# Patient Record
Sex: MALE | Race: WHITE | Employment: FULL TIME | URBAN - NONMETROPOLITAN AREA
[De-identification: names, ages, dates, MRNs, and addresses within clinical notes are randomized per-mention and may not be internally consistent; named-entity substitution may affect disease eponyms.]

---

## 2018-07-23 ENCOUNTER — HOSPITAL ENCOUNTER (EMERGENCY)
Age: 48
Discharge: HOME | End: 2018-07-23
Payer: COMMERCIAL

## 2018-07-23 VITALS
HEART RATE: 87 BPM | BODY MASS INDEX: 26.84 KG/M2 | SYSTOLIC BLOOD PRESSURE: 146 MMHG | WEIGHT: 167 LBS | HEIGHT: 66 IN | RESPIRATION RATE: 18 BRPM | TEMPERATURE: 98.5 F | OXYGEN SATURATION: 98 % | DIASTOLIC BLOOD PRESSURE: 92 MMHG

## 2018-07-23 DIAGNOSIS — M54.31 SCIATICA OF RIGHT SIDE: Primary | ICD-10-CM

## 2018-07-23 LAB
ALBUMIN SERPL-MCNC: 4.5 G/DL (ref 3.5–5.1)
ALP BLD-CCNC: 92 U/L (ref 38–126)
ALT SERPL-CCNC: 27 U/L (ref 11–66)
ANION GAP SERPL CALCULATED.3IONS-SCNC: 14 MEQ/L (ref 8–16)
AST SERPL-CCNC: 32 U/L (ref 5–40)
BILIRUB SERPL-MCNC: 0.2 MG/DL (ref 0.3–1.2)
BUN BLDV-MCNC: 11 MG/DL (ref 7–22)
CALCIUM SERPL-MCNC: 10.2 MG/DL (ref 8.5–10.5)
CHLORIDE BLD-SCNC: 97 MEQ/L (ref 98–111)
CO2: 29 MEQ/L (ref 23–33)
CREAT SERPL-MCNC: 0.7 MG/DL (ref 0.4–1.2)
GFR SERPL CREATININE-BSD FRML MDRD: > 90 ML/MIN/1.73M2
GLUCOSE BLD-MCNC: 93 MG/DL (ref 70–108)
MAGNESIUM: 2.1 MG/DL (ref 1.6–2.4)
OSMOLALITY CALCULATION: 278.5 MOSMOL/KG (ref 275–300)
POTASSIUM SERPL-SCNC: 4.7 MEQ/L (ref 3.5–5.2)
SODIUM BLD-SCNC: 140 MEQ/L (ref 135–145)
TOTAL PROTEIN: 7.7 G/DL (ref 6.1–8)

## 2018-07-23 PROCEDURE — 6360000002 HC RX W HCPCS: Performed by: NURSE PRACTITIONER

## 2018-07-23 PROCEDURE — 36415 COLL VENOUS BLD VENIPUNCTURE: CPT

## 2018-07-23 PROCEDURE — 6370000000 HC RX 637 (ALT 250 FOR IP): Performed by: NURSE PRACTITIONER

## 2018-07-23 PROCEDURE — 96372 THER/PROPH/DIAG INJ SC/IM: CPT

## 2018-07-23 PROCEDURE — 80053 COMPREHEN METABOLIC PANEL: CPT

## 2018-07-23 PROCEDURE — 99283 EMERGENCY DEPT VISIT LOW MDM: CPT

## 2018-07-23 PROCEDURE — 83735 ASSAY OF MAGNESIUM: CPT

## 2018-07-23 RX ORDER — KETOROLAC TROMETHAMINE 30 MG/ML
30 INJECTION, SOLUTION INTRAMUSCULAR; INTRAVENOUS ONCE
Status: COMPLETED | OUTPATIENT
Start: 2018-07-23 | End: 2018-07-23

## 2018-07-23 RX ORDER — LIDOCAINE 50 MG/G
1 PATCH TOPICAL DAILY
Status: DISCONTINUED | OUTPATIENT
Start: 2018-07-23 | End: 2018-07-23 | Stop reason: HOSPADM

## 2018-07-23 RX ORDER — PREDNISONE 10 MG/1
40 TABLET ORAL DAILY
Qty: 20 TABLET | Refills: 0 | Status: SHIPPED | OUTPATIENT
Start: 2018-07-23 | End: 2018-07-28

## 2018-07-23 RX ORDER — ORPHENADRINE CITRATE 30 MG/ML
60 INJECTION INTRAMUSCULAR; INTRAVENOUS ONCE
Status: COMPLETED | OUTPATIENT
Start: 2018-07-23 | End: 2018-07-23

## 2018-07-23 RX ORDER — LIDOCAINE 50 MG/G
1 PATCH TOPICAL DAILY
Qty: 30 PATCH | Refills: 0 | Status: SHIPPED | OUTPATIENT
Start: 2018-07-23 | End: 2019-11-07 | Stop reason: ALTCHOICE

## 2018-07-23 RX ORDER — MELOXICAM 7.5 MG/1
7.5 TABLET ORAL DAILY
Qty: 15 TABLET | Refills: 0 | Status: SHIPPED | OUTPATIENT
Start: 2018-07-23 | End: 2019-11-21 | Stop reason: ALTCHOICE

## 2018-07-23 RX ORDER — PREDNISONE 20 MG/1
40 TABLET ORAL ONCE
Status: COMPLETED | OUTPATIENT
Start: 2018-07-23 | End: 2018-07-23

## 2018-07-23 RX ADMIN — ORPHENADRINE CITRATE 60 MG: 30 INJECTION INTRAMUSCULAR; INTRAVENOUS at 17:57

## 2018-07-23 RX ADMIN — KETOROLAC TROMETHAMINE 30 MG: 30 INJECTION, SOLUTION INTRAMUSCULAR at 17:56

## 2018-07-23 RX ADMIN — PREDNISONE 40 MG: 20 TABLET ORAL at 19:10

## 2018-07-23 ASSESSMENT — ENCOUNTER SYMPTOMS
COUGH: 0
VOMITING: 0
EYE REDNESS: 0
BACK PAIN: 1
SORE THROAT: 0
RHINORRHEA: 0
WHEEZING: 0
EYE DISCHARGE: 0
ABDOMINAL PAIN: 0
NAUSEA: 0
DIARRHEA: 0
SHORTNESS OF BREATH: 0

## 2018-07-23 ASSESSMENT — PAIN SCALES - GENERAL: PAINLEVEL_OUTOF10: 9

## 2018-07-23 NOTE — ED PROVIDER NOTES
moist and mucous membranes are normal. No oropharyngeal exudate, posterior oropharyngeal edema or posterior oropharyngeal erythema. Eyes: Conjunctivae and EOM are normal.   Neck: Normal range of motion. Neck supple. No JVD present. Cardiovascular: Normal rate, regular rhythm, normal heart sounds, intact distal pulses and normal pulses. Exam reveals no gallop and no friction rub. No murmur heard. Pulmonary/Chest: Effort normal and breath sounds normal. No respiratory distress. He has no decreased breath sounds. He has no wheezes. He has no rhonchi. He has no rales. Abdominal: Soft. Bowel sounds are normal. He exhibits no distension. There is no tenderness. There is no rebound, no guarding and no CVA tenderness. Musculoskeletal: Normal range of motion. He exhibits no edema. Neurological: He is alert and oriented to person, place, and time. He exhibits normal muscle tone. Coordination normal.   Skin: Skin is warm and dry. No rash noted. He is not diaphoretic. Nursing note and vitals reviewed. DIFFERENTIAL DIAGNOSIS:   Including but not limited to chronic back pain, sciatica, and musculoskeletal pain. DIAGNOSTIC RESULTS     EKG: All EKG's are interpreted by the Emergency Department Physician who either signs or Co-signs this chart in the absence of a cardiologist.  None    RADIOLOGY: non-plain film images(s) such as CT, Ultrasound and MRI are read by the radiologist.  Plain radiographic images are visualized and preliminarily interpreted by the emergency physician unless otherwise stated below.   No orders to display         LABS:   Labs Reviewed   COMPREHENSIVE METABOLIC PANEL - Abnormal; Notable for the following:        Result Value    Chloride 97 (*)     Total Bilirubin 0.2 (*)     All other components within normal limits   MAGNESIUM   ANION GAP   GLOMERULAR FILTRATION RATE, ESTIMATED   OSMOLALITY         EMERGENCY DEPARTMENT COURSE AND MEDICAL DECISION MAKING:   Vitals:    Vitals: CNP  07/27/18 0154

## 2019-02-05 ENCOUNTER — APPOINTMENT (OUTPATIENT)
Dept: GENERAL RADIOLOGY | Age: 49
End: 2019-02-05
Payer: COMMERCIAL

## 2019-02-05 ENCOUNTER — HOSPITAL ENCOUNTER (EMERGENCY)
Age: 49
Discharge: HOME | End: 2019-02-05
Payer: COMMERCIAL

## 2019-02-05 VITALS
DIASTOLIC BLOOD PRESSURE: 73 MMHG | BODY MASS INDEX: 27.6 KG/M2 | RESPIRATION RATE: 16 BRPM | WEIGHT: 171 LBS | SYSTOLIC BLOOD PRESSURE: 113 MMHG | TEMPERATURE: 99.1 F | HEART RATE: 92 BPM | OXYGEN SATURATION: 91 %

## 2019-02-05 DIAGNOSIS — B34.9 VIRAL ILLNESS: Primary | ICD-10-CM

## 2019-02-05 LAB
ALBUMIN SERPL-MCNC: 5 G/DL (ref 3.5–5.1)
ALP BLD-CCNC: 102 U/L (ref 38–126)
ALT SERPL-CCNC: 67 U/L (ref 11–66)
ANION GAP SERPL CALCULATED.3IONS-SCNC: 18 MEQ/L (ref 8–16)
AST SERPL-CCNC: 99 U/L (ref 5–40)
BACTERIA: ABNORMAL /HPF
BASOPHILS # BLD: 0.1 %
BASOPHILS ABSOLUTE: 0 THOU/MM3 (ref 0–0.1)
BILIRUB SERPL-MCNC: 0.6 MG/DL (ref 0.3–1.2)
BILIRUBIN DIRECT: < 0.2 MG/DL (ref 0–0.3)
BILIRUBIN URINE: ABNORMAL
BLOOD, URINE: ABNORMAL
BUN BLDV-MCNC: 22 MG/DL (ref 7–22)
CALCIUM SERPL-MCNC: 9.6 MG/DL (ref 8.5–10.5)
CASTS 2: ABNORMAL /LPF
CASTS UA: ABNORMAL /LPF
CHARACTER, URINE: CLEAR
CHLORIDE BLD-SCNC: 99 MEQ/L (ref 98–111)
CO2: 25 MEQ/L (ref 23–33)
COLOR: ABNORMAL
CREAT SERPL-MCNC: 0.8 MG/DL (ref 0.4–1.2)
CRYSTALS, UA: ABNORMAL
EOSINOPHIL # BLD: 0 %
EOSINOPHILS ABSOLUTE: 0 THOU/MM3 (ref 0–0.4)
EPITHELIAL CELLS, UA: ABNORMAL /HPF
ERYTHROCYTE [DISTWIDTH] IN BLOOD BY AUTOMATED COUNT: 13.4 % (ref 11.5–14.5)
ERYTHROCYTE [DISTWIDTH] IN BLOOD BY AUTOMATED COUNT: 42 FL (ref 35–45)
FLU A ANTIGEN: NEGATIVE
FLU B ANTIGEN: NEGATIVE
GFR SERPL CREATININE-BSD FRML MDRD: > 90 ML/MIN/1.73M2
GLUCOSE BLD-MCNC: 124 MG/DL (ref 70–108)
GLUCOSE URINE: NEGATIVE MG/DL
HCT VFR BLD CALC: 49.6 % (ref 42–52)
HEMOGLOBIN: 17 GM/DL (ref 14–18)
ICTOTEST: NEGATIVE
IMMATURE GRANS (ABS): 0.02 THOU/MM3 (ref 0–0.07)
IMMATURE GRANULOCYTES: 0.2 %
KETONES, URINE: NEGATIVE
LEUKOCYTE ESTERASE, URINE: NEGATIVE
LIPASE: 38.8 U/L (ref 5.6–51.3)
LYMPHOCYTES # BLD: 8.4 %
LYMPHOCYTES ABSOLUTE: 0.7 THOU/MM3 (ref 1–4.8)
MCH RBC QN AUTO: 29.7 PG (ref 26–33)
MCHC RBC AUTO-ENTMCNC: 34.3 GM/DL (ref 32.2–35.5)
MCV RBC AUTO: 86.6 FL (ref 80–94)
MISCELLANEOUS 2: ABNORMAL
MONOCYTES # BLD: 6.2 %
MONOCYTES ABSOLUTE: 0.5 THOU/MM3 (ref 0.4–1.3)
MUCUS: ABNORMAL
NITRITE, URINE: NEGATIVE
NUCLEATED RED BLOOD CELLS: 0 /100 WBC
OSMOLALITY CALCULATION: 287.9 MOSMOL/KG (ref 275–300)
PH UA: 5.5
PLATELET # BLD: 206 THOU/MM3 (ref 130–400)
PMV BLD AUTO: 10.3 FL (ref 9.4–12.4)
POTASSIUM SERPL-SCNC: 4.4 MEQ/L (ref 3.5–5.2)
PRO-BNP: 41 PG/ML (ref 0–450)
PROTEIN UA: 100
RBC # BLD: 5.73 MILL/MM3 (ref 4.7–6.1)
RBC URINE: ABNORMAL /HPF
RENAL EPITHELIAL, UA: ABNORMAL
SEG NEUTROPHILS: 85.1 %
SEGMENTED NEUTROPHILS ABSOLUTE COUNT: 7.5 THOU/MM3 (ref 1.8–7.7)
SODIUM BLD-SCNC: 142 MEQ/L (ref 135–145)
SPECIFIC GRAVITY, URINE: > 1.03 (ref 1–1.03)
TOTAL PROTEIN: 7.9 G/DL (ref 6.1–8)
TROPONIN T: < 0.01 NG/ML
UROBILINOGEN, URINE: 1 EU/DL
WBC # BLD: 8.8 THOU/MM3 (ref 4.8–10.8)
WBC UA: ABNORMAL /HPF
YEAST: ABNORMAL

## 2019-02-05 PROCEDURE — 87804 INFLUENZA ASSAY W/OPTIC: CPT

## 2019-02-05 PROCEDURE — 81001 URINALYSIS AUTO W/SCOPE: CPT

## 2019-02-05 PROCEDURE — 99284 EMERGENCY DEPT VISIT MOD MDM: CPT

## 2019-02-05 PROCEDURE — 71046 X-RAY EXAM CHEST 2 VIEWS: CPT

## 2019-02-05 PROCEDURE — 6370000000 HC RX 637 (ALT 250 FOR IP): Performed by: PHYSICIAN ASSISTANT

## 2019-02-05 PROCEDURE — 85025 COMPLETE CBC W/AUTO DIFF WBC: CPT

## 2019-02-05 PROCEDURE — 82248 BILIRUBIN DIRECT: CPT

## 2019-02-05 PROCEDURE — 2580000003 HC RX 258: Performed by: PHYSICIAN ASSISTANT

## 2019-02-05 PROCEDURE — 84484 ASSAY OF TROPONIN QUANT: CPT

## 2019-02-05 PROCEDURE — 96374 THER/PROPH/DIAG INJ IV PUSH: CPT

## 2019-02-05 PROCEDURE — 36415 COLL VENOUS BLD VENIPUNCTURE: CPT

## 2019-02-05 PROCEDURE — 6360000002 HC RX W HCPCS: Performed by: PHYSICIAN ASSISTANT

## 2019-02-05 PROCEDURE — 80053 COMPREHEN METABOLIC PANEL: CPT

## 2019-02-05 PROCEDURE — 83690 ASSAY OF LIPASE: CPT

## 2019-02-05 PROCEDURE — 83880 ASSAY OF NATRIURETIC PEPTIDE: CPT

## 2019-02-05 PROCEDURE — 74018 RADEX ABDOMEN 1 VIEW: CPT

## 2019-02-05 RX ORDER — 0.9 % SODIUM CHLORIDE 0.9 %
500 INTRAVENOUS SOLUTION INTRAVENOUS ONCE
Status: COMPLETED | OUTPATIENT
Start: 2019-02-05 | End: 2019-02-05

## 2019-02-05 RX ORDER — ONDANSETRON 2 MG/ML
4 INJECTION INTRAMUSCULAR; INTRAVENOUS ONCE
Status: COMPLETED | OUTPATIENT
Start: 2019-02-05 | End: 2019-02-05

## 2019-02-05 RX ORDER — SUCRALFATE 1 G/1
1 TABLET ORAL ONCE
Status: COMPLETED | OUTPATIENT
Start: 2019-02-05 | End: 2019-02-05

## 2019-02-05 RX ORDER — BENZONATATE 100 MG/1
100 CAPSULE ORAL 3 TIMES DAILY PRN
Qty: 20 CAPSULE | Refills: 0 | Status: SHIPPED | OUTPATIENT
Start: 2019-02-05 | End: 2019-11-07

## 2019-02-05 RX ORDER — ONDANSETRON 4 MG/1
4 TABLET, ORALLY DISINTEGRATING ORAL EVERY 8 HOURS PRN
Qty: 20 TABLET | Refills: 0 | Status: SHIPPED | OUTPATIENT
Start: 2019-02-05 | End: 2019-11-07

## 2019-02-05 RX ORDER — PANTOPRAZOLE SODIUM 20 MG/1
40 TABLET, DELAYED RELEASE ORAL DAILY
Qty: 30 TABLET | Refills: 0 | Status: SHIPPED | OUTPATIENT
Start: 2019-02-05 | End: 2019-11-07

## 2019-02-05 RX ORDER — SUCRALFATE ORAL 1 G/10ML
1 SUSPENSION ORAL 4 TIMES DAILY
Qty: 240 ML | Refills: 0 | Status: SHIPPED | OUTPATIENT
Start: 2019-02-05 | End: 2019-11-07

## 2019-02-05 RX ADMIN — LIDOCAINE HYDROCHLORIDE: 20 SOLUTION ORAL; TOPICAL at 17:30

## 2019-02-05 RX ADMIN — ONDANSETRON 4 MG: 2 INJECTION INTRAMUSCULAR; INTRAVENOUS at 16:49

## 2019-02-05 RX ADMIN — SODIUM CHLORIDE 500 ML: 9 INJECTION, SOLUTION INTRAVENOUS at 16:45

## 2019-02-05 RX ADMIN — SUCRALFATE 1 G: 1 TABLET ORAL at 17:30

## 2019-02-05 ASSESSMENT — ENCOUNTER SYMPTOMS
RHINORRHEA: 0
SORE THROAT: 0
CONSTIPATION: 0
ABDOMINAL PAIN: 1
NAUSEA: 1
PHOTOPHOBIA: 0
EYE PAIN: 0
BLOOD IN STOOL: 0
SHORTNESS OF BREATH: 1
COUGH: 1
DIARRHEA: 1
BACK PAIN: 0
CHEST TIGHTNESS: 1
WHEEZING: 0
VOMITING: 1

## 2019-11-07 ENCOUNTER — HOSPITAL ENCOUNTER (EMERGENCY)
Age: 49
Discharge: HOME | End: 2019-11-07
Payer: COMMERCIAL

## 2019-11-07 ENCOUNTER — HOSPITAL ENCOUNTER (EMERGENCY)
Dept: GENERAL RADIOLOGY | Age: 49
Discharge: HOME | End: 2019-11-07
Payer: COMMERCIAL

## 2019-11-07 VITALS
TEMPERATURE: 97.6 F | RESPIRATION RATE: 16 BRPM | HEART RATE: 72 BPM | BODY MASS INDEX: 27.6 KG/M2 | DIASTOLIC BLOOD PRESSURE: 81 MMHG | SYSTOLIC BLOOD PRESSURE: 147 MMHG | WEIGHT: 171 LBS | OXYGEN SATURATION: 97 %

## 2019-11-07 DIAGNOSIS — M77.11 RIGHT LATERAL EPICONDYLITIS: Primary | ICD-10-CM

## 2019-11-07 PROCEDURE — 73080 X-RAY EXAM OF ELBOW: CPT

## 2019-11-07 PROCEDURE — 99213 OFFICE O/P EST LOW 20 MIN: CPT

## 2019-11-07 PROCEDURE — 99214 OFFICE O/P EST MOD 30 MIN: CPT | Performed by: NURSE PRACTITIONER

## 2019-11-07 RX ORDER — PREDNISONE 20 MG/1
20 TABLET ORAL 2 TIMES DAILY
Qty: 14 TABLET | Refills: 0 | Status: SHIPPED | OUTPATIENT
Start: 2019-11-07 | End: 2019-11-14

## 2019-11-07 SDOH — HEALTH STABILITY: MENTAL HEALTH: HOW OFTEN DO YOU HAVE A DRINK CONTAINING ALCOHOL?: NEVER

## 2019-11-07 ASSESSMENT — PAIN DESCRIPTION - LOCATION: LOCATION: ELBOW

## 2019-11-07 ASSESSMENT — PAIN DESCRIPTION - DESCRIPTORS: DESCRIPTORS: BURNING

## 2019-11-07 ASSESSMENT — PAIN DESCRIPTION - ORIENTATION: ORIENTATION: RIGHT

## 2019-11-07 ASSESSMENT — PAIN SCALES - GENERAL: PAINLEVEL_OUTOF10: 8

## 2019-11-07 ASSESSMENT — PAIN DESCRIPTION - PAIN TYPE: TYPE: ACUTE PAIN

## 2019-11-07 ASSESSMENT — PAIN DESCRIPTION - FREQUENCY: FREQUENCY: CONTINUOUS

## 2019-11-08 ASSESSMENT — ENCOUNTER SYMPTOMS
DIARRHEA: 0
COUGH: 0
RHINORRHEA: 0
VOMITING: 0
BACK PAIN: 0
EYE DISCHARGE: 0
EYE REDNESS: 0
SORE THROAT: 0
TROUBLE SWALLOWING: 0
NAUSEA: 0
SHORTNESS OF BREATH: 0
EYE PAIN: 0
ABDOMINAL PAIN: 0
ALLERGIC/IMMUNOLOGIC NEGATIVE: 1
WHEEZING: 0
CONSTIPATION: 0

## 2019-11-21 ENCOUNTER — OFFICE VISIT (OUTPATIENT)
Dept: FAMILY MEDICINE CLINIC | Age: 49
End: 2019-11-21
Payer: COMMERCIAL

## 2019-11-21 VITALS
SYSTOLIC BLOOD PRESSURE: 100 MMHG | WEIGHT: 171 LBS | RESPIRATION RATE: 20 BRPM | HEIGHT: 68 IN | TEMPERATURE: 97.9 F | DIASTOLIC BLOOD PRESSURE: 72 MMHG | HEART RATE: 84 BPM | BODY MASS INDEX: 25.91 KG/M2

## 2019-11-21 DIAGNOSIS — Z87.891 HISTORY OF SMOKING 30 OR MORE PACK YEARS: ICD-10-CM

## 2019-11-21 DIAGNOSIS — Z00.00 WELLNESS EXAMINATION: Primary | ICD-10-CM

## 2019-11-21 DIAGNOSIS — M77.11 RIGHT LATERAL EPICONDYLITIS: ICD-10-CM

## 2019-11-21 DIAGNOSIS — F41.9 ANXIETY: ICD-10-CM

## 2019-11-21 LAB
ALBUMIN SERPL-MCNC: 4.4 G/DL (ref 3.5–5.1)
ALP BLD-CCNC: 109 U/L (ref 38–126)
ALT SERPL-CCNC: 18 U/L (ref 11–66)
ANION GAP SERPL CALCULATED.3IONS-SCNC: 14 MEQ/L (ref 8–16)
AST SERPL-CCNC: 28 U/L (ref 5–40)
AVERAGE GLUCOSE: 120 MG/DL (ref 70–126)
BASOPHILS # BLD: 0.2 %
BASOPHILS ABSOLUTE: 0 THOU/MM3 (ref 0–0.1)
BILIRUB SERPL-MCNC: 0.2 MG/DL (ref 0.3–1.2)
BILIRUBIN DIRECT: < 0.2 MG/DL (ref 0–0.3)
BUN BLDV-MCNC: 11 MG/DL (ref 7–22)
CALCIUM SERPL-MCNC: 9.8 MG/DL (ref 8.5–10.5)
CHLORIDE BLD-SCNC: 100 MEQ/L (ref 98–111)
CHOLESTEROL, TOTAL: 180 MG/DL (ref 100–199)
CO2: 26 MEQ/L (ref 23–33)
CREAT SERPL-MCNC: 0.6 MG/DL (ref 0.4–1.2)
EOSINOPHIL # BLD: 1.8 %
EOSINOPHILS ABSOLUTE: 0.2 THOU/MM3 (ref 0–0.4)
ERYTHROCYTE [DISTWIDTH] IN BLOOD BY AUTOMATED COUNT: 13.9 % (ref 11.5–14.5)
ERYTHROCYTE [DISTWIDTH] IN BLOOD BY AUTOMATED COUNT: 45.1 FL (ref 35–45)
GFR SERPL CREATININE-BSD FRML MDRD: > 90 ML/MIN/1.73M2
GLUCOSE BLD-MCNC: 131 MG/DL (ref 70–108)
HBA1C MFR BLD: 6 % (ref 4.4–6.4)
HCT VFR BLD CALC: 44.8 % (ref 42–52)
HDLC SERPL-MCNC: 37 MG/DL
HEMOGLOBIN: 14.8 GM/DL (ref 14–18)
IMMATURE GRANS (ABS): 0.02 THOU/MM3 (ref 0–0.07)
IMMATURE GRANULOCYTES: 0.2 %
LDL CHOLESTEROL CALCULATED: 94 MG/DL
LYMPHOCYTES # BLD: 23.3 %
LYMPHOCYTES ABSOLUTE: 2.3 THOU/MM3 (ref 1–4.8)
MCH RBC QN AUTO: 29.1 PG (ref 26–33)
MCHC RBC AUTO-ENTMCNC: 33 GM/DL (ref 32.2–35.5)
MCV RBC AUTO: 88 FL (ref 80–94)
MONOCYTES # BLD: 4.2 %
MONOCYTES ABSOLUTE: 0.4 THOU/MM3 (ref 0.4–1.3)
NUCLEATED RED BLOOD CELLS: 0 /100 WBC
PLATELET # BLD: 209 THOU/MM3 (ref 130–400)
PMV BLD AUTO: 11.5 FL (ref 9.4–12.4)
POTASSIUM SERPL-SCNC: 3.8 MEQ/L (ref 3.5–5.2)
RBC # BLD: 5.09 MILL/MM3 (ref 4.7–6.1)
SEG NEUTROPHILS: 70.3 %
SEGMENTED NEUTROPHILS ABSOLUTE COUNT: 7 THOU/MM3 (ref 1.8–7.7)
SODIUM BLD-SCNC: 140 MEQ/L (ref 135–145)
TOTAL PROTEIN: 7.5 G/DL (ref 6.1–8)
TRIGL SERPL-MCNC: 244 MG/DL (ref 0–199)
WBC # BLD: 9.9 THOU/MM3 (ref 4.8–10.8)

## 2019-11-21 PROCEDURE — 99386 PREV VISIT NEW AGE 40-64: CPT | Performed by: NURSE PRACTITIONER

## 2019-11-21 PROCEDURE — 90688 IIV4 VACCINE SPLT 0.5 ML IM: CPT | Performed by: NURSE PRACTITIONER

## 2019-11-21 PROCEDURE — 90471 IMMUNIZATION ADMIN: CPT | Performed by: NURSE PRACTITIONER

## 2019-11-21 RX ORDER — TRAMADOL HYDROCHLORIDE 50 MG/1
50 TABLET ORAL EVERY 8 HOURS PRN
Qty: 21 TABLET | Refills: 0 | Status: SHIPPED | OUTPATIENT
Start: 2019-11-21 | End: 2019-11-28

## 2019-11-21 ASSESSMENT — ENCOUNTER SYMPTOMS
COUGH: 0
SORE THROAT: 0
EYE PAIN: 0
VOMITING: 0
ALLERGIC/IMMUNOLOGIC NEGATIVE: 1
CONSTIPATION: 0
BACK PAIN: 0
WHEEZING: 0
ABDOMINAL PAIN: 0
EYE REDNESS: 0
SHORTNESS OF BREATH: 0
DIARRHEA: 0
EYE DISCHARGE: 0
RHINORRHEA: 0
NAUSEA: 0
TROUBLE SWALLOWING: 0

## 2019-11-21 ASSESSMENT — PATIENT HEALTH QUESTIONNAIRE - PHQ9
SUM OF ALL RESPONSES TO PHQ QUESTIONS 1-9: 0
2. FEELING DOWN, DEPRESSED OR HOPELESS: 0
1. LITTLE INTEREST OR PLEASURE IN DOING THINGS: 0
SUM OF ALL RESPONSES TO PHQ9 QUESTIONS 1 & 2: 0
SUM OF ALL RESPONSES TO PHQ QUESTIONS 1-9: 0

## 2019-11-22 ENCOUNTER — TELEPHONE (OUTPATIENT)
Dept: FAMILY MEDICINE CLINIC | Age: 49
End: 2019-11-22

## 2019-11-22 DIAGNOSIS — E78.1 HIGH TRIGLYCERIDES: Primary | ICD-10-CM

## 2019-11-23 LAB — HIV-2 AB: NEGATIVE

## 2019-12-05 ENCOUNTER — HOSPITAL ENCOUNTER (OUTPATIENT)
Dept: MRI IMAGING | Age: 49
Discharge: HOME | End: 2019-12-05
Payer: COMMERCIAL

## 2019-12-05 DIAGNOSIS — M77.11 RIGHT LATERAL EPICONDYLITIS: ICD-10-CM

## 2019-12-05 PROCEDURE — 73221 MRI JOINT UPR EXTREM W/O DYE: CPT

## 2020-10-01 ENCOUNTER — OFFICE VISIT (OUTPATIENT)
Dept: FAMILY MEDICINE CLINIC | Age: 50
End: 2020-10-01
Payer: COMMERCIAL

## 2020-10-01 VITALS
RESPIRATION RATE: 16 BRPM | SYSTOLIC BLOOD PRESSURE: 120 MMHG | OXYGEN SATURATION: 94 % | WEIGHT: 166.6 LBS | TEMPERATURE: 97 F | DIASTOLIC BLOOD PRESSURE: 78 MMHG | BODY MASS INDEX: 25.71 KG/M2 | HEART RATE: 99 BPM

## 2020-10-01 PROCEDURE — 99396 PREV VISIT EST AGE 40-64: CPT | Performed by: NURSE PRACTITIONER

## 2020-10-01 RX ORDER — PREDNISONE 20 MG/1
20 TABLET ORAL 2 TIMES DAILY
Qty: 10 TABLET | Refills: 0 | Status: SHIPPED | OUTPATIENT
Start: 2020-10-01 | End: 2020-10-06

## 2020-10-01 RX ORDER — LORAZEPAM 2 MG/1
2 TABLET ORAL DAILY
Qty: 10 TABLET | Refills: 0 | Status: SHIPPED | OUTPATIENT
Start: 2020-10-01 | End: 2021-02-22 | Stop reason: SDUPTHER

## 2020-10-01 RX ORDER — ATORVASTATIN CALCIUM 10 MG/1
10 TABLET, FILM COATED ORAL DAILY
Qty: 90 TABLET | Refills: 1 | Status: SHIPPED | OUTPATIENT
Start: 2020-10-01 | End: 2021-07-14 | Stop reason: SDUPTHER

## 2020-10-01 SDOH — ECONOMIC STABILITY: FOOD INSECURITY: WITHIN THE PAST 12 MONTHS, THE FOOD YOU BOUGHT JUST DIDN'T LAST AND YOU DIDN'T HAVE MONEY TO GET MORE.: NEVER TRUE

## 2020-10-01 SDOH — ECONOMIC STABILITY: TRANSPORTATION INSECURITY
IN THE PAST 12 MONTHS, HAS LACK OF TRANSPORTATION KEPT YOU FROM MEETINGS, WORK, OR FROM GETTING THINGS NEEDED FOR DAILY LIVING?: NO

## 2020-10-01 SDOH — ECONOMIC STABILITY: FOOD INSECURITY: WITHIN THE PAST 12 MONTHS, YOU WORRIED THAT YOUR FOOD WOULD RUN OUT BEFORE YOU GOT MONEY TO BUY MORE.: NEVER TRUE

## 2020-10-01 SDOH — ECONOMIC STABILITY: TRANSPORTATION INSECURITY
IN THE PAST 12 MONTHS, HAS THE LACK OF TRANSPORTATION KEPT YOU FROM MEDICAL APPOINTMENTS OR FROM GETTING MEDICATIONS?: NO

## 2020-10-01 SDOH — ECONOMIC STABILITY: INCOME INSECURITY: HOW HARD IS IT FOR YOU TO PAY FOR THE VERY BASICS LIKE FOOD, HOUSING, MEDICAL CARE, AND HEATING?: NOT HARD AT ALL

## 2020-10-01 ASSESSMENT — ENCOUNTER SYMPTOMS
CONSTIPATION: 0
SHORTNESS OF BREATH: 0
ALLERGIC/IMMUNOLOGIC NEGATIVE: 1
RHINORRHEA: 0
DIARRHEA: 0
EYE PAIN: 0
COUGH: 0
VOMITING: 0
BACK PAIN: 0
EYE REDNESS: 0
ABDOMINAL PAIN: 0
SORE THROAT: 0
WHEEZING: 0
TROUBLE SWALLOWING: 0
NAUSEA: 0
EYE DISCHARGE: 0

## 2020-10-01 ASSESSMENT — PATIENT HEALTH QUESTIONNAIRE - PHQ9
1. LITTLE INTEREST OR PLEASURE IN DOING THINGS: 0
SUM OF ALL RESPONSES TO PHQ QUESTIONS 1-9: 0
SUM OF ALL RESPONSES TO PHQ QUESTIONS 1-9: 0
2. FEELING DOWN, DEPRESSED OR HOPELESS: 0
SUM OF ALL RESPONSES TO PHQ9 QUESTIONS 1 & 2: 0

## 2020-10-01 NOTE — PROGRESS NOTES
10/1/2020    Shashank Vail (:  1970) is a 52 y.o. male, here for a preventive medicine evaluation. Main recent issue has to do with panic attacks a couple times a month recently due to many deaths around him and fear of being ill. Patient states he has taken Ativan about 20 times a year for the last 10 years. This has been given to him by someone else. Has never used street drugs, narcotics or antidepressants. Is happy in his marriage, has no recent injuries on the job as a . Currently has upper respiratory infection symptoms, continues to smoke. No diarrhea or constipation problems, no back pain or headaches. Patient Active Problem List   Diagnosis    CAD (coronary artery disease)    MI, old    Arthralgia    Rash    History of smoking 30 or more pack years    Anxiety       Review of Systems   Constitutional: Negative for activity change, fatigue and fever. HENT: Negative for congestion, ear pain, rhinorrhea, sore throat and trouble swallowing. Eyes: Negative for pain, discharge and redness. Respiratory: Negative for cough, shortness of breath and wheezing. Cardiovascular: Negative. Gastrointestinal: Negative for abdominal pain, constipation, diarrhea, nausea and vomiting. Endocrine: Negative. Genitourinary: Negative for dysuria, frequency and urgency. Musculoskeletal: Negative for arthralgias, back pain and myalgias. Skin: Negative for rash. Allergic/Immunologic: Negative. Neurological: Negative for dizziness, tremors, weakness and headaches. Hematological: Negative. Psychiatric/Behavioral: Negative for dysphoric mood and sleep disturbance. The patient is nervous/anxious. Prior to Visit Medications    Medication Sig Taking?  Authorizing Provider   atorvastatin (LIPITOR) 10 MG tablet Take 1 tablet by mouth daily Yes ROSAURA Bolanos - CNP   predniSONE (DELTASONE) 20 MG tablet Take 1 tablet by mouth 2 times daily for 5 days Yes ROSAURA Anguiano CNP   LORazepam (ATIVAN) 2 MG tablet Take 1 tablet by mouth daily for 10 days. Yes ROSAURA Anguiano CNP   aspirin EC 81 MG EC tablet Take 1 tablet by mouth daily.   Ankita Mera MD        Allergies   Allergen Reactions    Morphine Rash       Past Medical History:   Diagnosis Date    Anginal pain ([facility] [address] 2005    Asthma 1984    CAD (coronary artery disease) 4/2012    Previously seen Dr. Kim Quiros, old 04/30/2012       Past Surgical History:   Procedure Laterality Date   [address]  4/2012    Per Pt \"no stent\"    Sigrid Serna  2009    Dr. Layo Gruber  2008    benign tumor       Social History     Socioeconomic History    Marital status:      Spouse name: Dora Barnes Number of children: 4    Years of education: 13    Highest education level: Not on file   Occupational History    Occupation:      Comment: self-employeed   Social Needs    Financial resource strain: Not hard at all   Raven[place]-Luz Maria insecurity     Worry: Never true     Inability: Never true    Transportation needs     Medical: No     Non-medical: No   Tobacco Use    Smoking status: Current Every Day Smoker     Packs/day: 1.50     Years: 30.00     Pack years: 45.00    Smokeless tobacco: Never Used    Tobacco comment: printed to avs   Substance and Sexual Activity    Alcohol use: No     Frequency: Never    Drug use: No    Sexual activity: Not on file   Lifestyle    Physical activity     Days per week: Not on file     Minutes per session: Not on file    Stress: Not on file   Relationships    Social connections     Talks on phone: Not on file     Gets together: Not on file     Attends Baptism service: Not on file     Active member of club or organization: Not on file     Attends meetings of clubs or organizations: Not on file     Relationship status: Not on file    Intimate partner violence     Fear of current or ex partner: Not on file Emotionally abused: Not on file     Physically abused: Not on file     Forced sexual activity: Not on file   Other Topics Concern    Not on file   Social History Narrative    Not on file        Family History   Problem Relation Age of Onset    Arthritis Mother     Alcohol Abuse Father     COPD Father 62    Hypertension Father 54    Cancer Father         Unsure    Cancer Maternal Aunt 61        lung     Cancer Paternal Aunt     Parkinsonism Paternal Uncle     Cancer Maternal Grandfather 61    Heart Attack Paternal Grandfather     Heart Disease Paternal Grandfather     Coronary Art Dis Paternal Grandfather     Stroke Paternal Aunt     Cancer Paternal Aunt     Alzheimer's Disease Paternal Aunt     Cancer Paternal Uncle     Other Paternal Uncle         Lung Infection     Diabetes Paternal Uncle        ADVANCE DIRECTIVE: N, <no information>    Vitals:    10/01/20 0937   BP: 120/78   Pulse: 99   Resp: 16   Temp: 97 °F (36.1 °C)   TempSrc: Temporal   SpO2: 94%   Weight: 166 lb 9.6 oz (75.6 kg)     Estimated body mass index is 25.71 kg/m² as calculated from the following:    Height as of 11/21/19: 5' 7.5\" (1.715 m). Weight as of this encounter: 166 lb 9.6 oz (75.6 kg). Physical Exam  Vitals signs reviewed. Constitutional:       General: He is not in acute distress. Appearance: Normal appearance. He is well-developed. He is not toxic-appearing or diaphoretic. HENT:      Head: Normocephalic. No right periorbital erythema or left periorbital erythema. Jaw: No trismus. Right Ear: Hearing, tympanic membrane, ear canal and external ear normal.      Left Ear: Hearing, tympanic membrane, ear canal and external ear normal.      Nose: Nose normal. No mucosal edema or rhinorrhea. Mouth/Throat:      Mouth: Mucous membranes are moist.      Dentition: Normal dentition. Pharynx: Uvula midline. No posterior oropharyngeal erythema. Tonsils: No tonsillar exudate. 0 on the right.  0

## 2021-01-19 ENCOUNTER — VIRTUAL VISIT (OUTPATIENT)
Dept: FAMILY MEDICINE CLINIC | Age: 51
End: 2021-01-19
Payer: COMMERCIAL

## 2021-01-19 DIAGNOSIS — J06.9 URI WITH COUGH AND CONGESTION: Primary | ICD-10-CM

## 2021-01-19 PROCEDURE — 99442 PR PHYS/QHP TELEPHONE EVALUATION 11-20 MIN: CPT | Performed by: NURSE PRACTITIONER

## 2021-01-19 ASSESSMENT — ENCOUNTER SYMPTOMS
BACK PAIN: 0
COUGH: 0
CONSTIPATION: 0
SHORTNESS OF BREATH: 0
NAUSEA: 0
EYE DISCHARGE: 0
DIARRHEA: 0
RHINORRHEA: 0
WHEEZING: 0
EYE REDNESS: 0
ABDOMINAL PAIN: 0
TROUBLE SWALLOWING: 0
VOMITING: 0
EYE PAIN: 0
SORE THROAT: 0
ALLERGIC/IMMUNOLOGIC NEGATIVE: 1

## 2021-01-19 NOTE — PROGRESS NOTES
[address] [address] [address]  [facility] [address]  Dept: [phone]  Dept Fax: [phone]  Loc: [phone]     Visit Date:  1/19/2021      Patient:  Aydee Gómez  YOB: 1970    HPI:     Chief Complaint   Patient presents with    Pharyngitis       This is a phone visit. Symptoms starting 3 days ago. Congestion, cough, sore throat. Denies fever, fatigue, diarrhea, headache. Is a smoker. Pharyngitis  Pertinent negatives include no abdominal pain, arthralgias, congestion, coughing, fatigue, fever, headaches, myalgias, nausea, rash, sore throat, vomiting or weakness. Medications    Current Outpatient Medications:     atorvastatin (LIPITOR) 10 MG tablet, Take 1 tablet by mouth daily, Disp: 90 tablet, Rfl: 1    aspirin EC 81 MG EC tablet, Take 1 tablet by mouth daily. , Disp: 30 tablet, Rfl: 3    The patient is allergic to morphine. Past Medical History  Justo Laws  has a past medical history of Anginal pain ([facility] [address] Asthma, CAD (coronary artery disease), and MI, old. Subjective:      Review of Systems   Constitutional: Negative for activity change, fatigue and fever. HENT: Negative for congestion, ear pain, rhinorrhea, sore throat and trouble swallowing. Eyes: Negative for pain, discharge and redness. Respiratory: Negative for cough, shortness of breath and wheezing. Cardiovascular: Negative. Gastrointestinal: Negative for abdominal pain, constipation, diarrhea, nausea and vomiting. Endocrine: Negative. Genitourinary: Negative for dysuria, frequency and urgency. Musculoskeletal: Negative for arthralgias, back pain and myalgias. Skin: Negative for rash. Allergic/Immunologic: Negative. Neurological: Negative for dizziness, tremors, weakness and headaches. Hematological: Negative. Psychiatric/Behavioral: Negative for dysphoric mood and sleep disturbance. The patient is not nervous/anxious. Objective: There were no vitals taken for this visit. Physical Exam  Constitutional:       General: He is not in acute distress. Neurological:      Mental Status: He is alert and oriented to person, place, and time. Psychiatric:         Mood and Affect: Mood normal.         Behavior: Behavior normal.         Thought Content: Thought content normal.         Judgment: Judgment normal.         Assessment/Plan:      Fadia Griffin was seen today for pharyngitis. Diagnoses and all orders for this visit:    URI with cough and congestion  -     COVID-19 Ambulatory; Future      Dannie Niño is a 52 y.o. male evaluated via telephone on 1/19/2021. Consent:  He and/or health care decision maker is aware that that he may receive a bill for this telephone service, depending on his insurance coverage, and has provided verbal consent to proceed: Yes      I affirm this is a Patient Initiated Episode with a Patient who has not had a related appointment within my department in the past 7 days or scheduled within the next 24 hours. Patient identification was verified at the start of the visit: Yes    Total Time: minutes: 11-20 minutes    Note: not billable if this call serves to triage the patient into an appointment for the relevant concern      Yumikoshelly Walton       Return if symptoms worsen or fail to improve.

## 2021-01-20 LAB — SARS-COV-2: NOT DETECTED

## 2021-01-25 ENCOUNTER — TELEPHONE (OUTPATIENT)
Dept: FAMILY MEDICINE CLINIC | Age: 51
End: 2021-01-25

## 2021-01-25 DIAGNOSIS — J06.9 ACUTE UPPER RESPIRATORY INFECTION: Primary | ICD-10-CM

## 2021-01-25 RX ORDER — PREDNISONE 20 MG/1
20 TABLET ORAL 2 TIMES DAILY
Qty: 10 TABLET | Refills: 0 | Status: SHIPPED | OUTPATIENT
Start: 2021-01-25 | End: 2021-01-30

## 2021-01-25 RX ORDER — AZITHROMYCIN 250 MG/1
TABLET, FILM COATED ORAL
Qty: 6 TABLET | Refills: 0 | Status: SHIPPED | OUTPATIENT
Start: 2021-01-25 | End: 2021-02-22

## 2021-01-25 NOTE — TELEPHONE ENCOUNTER
Patient saw you last week. Covid was negative. Patient took Mucinex all week. Still coughing, a lot of phlegm in his chest. Some SOB. Requesting ATB and steroids. Says it is so bad \"he can't even smoke\".      Call patient back

## 2021-02-22 ENCOUNTER — OFFICE VISIT (OUTPATIENT)
Dept: FAMILY MEDICINE CLINIC | Age: 51
End: 2021-02-22
Payer: COMMERCIAL

## 2021-02-22 VITALS
BODY MASS INDEX: 28.56 KG/M2 | HEIGHT: 67 IN | TEMPERATURE: 97.6 F | RESPIRATION RATE: 16 BRPM | DIASTOLIC BLOOD PRESSURE: 80 MMHG | HEART RATE: 80 BPM | SYSTOLIC BLOOD PRESSURE: 130 MMHG | WEIGHT: 182 LBS

## 2021-02-22 DIAGNOSIS — Z00.00 WELLNESS EXAMINATION: ICD-10-CM

## 2021-02-22 DIAGNOSIS — F41.9 ANXIETY: Primary | ICD-10-CM

## 2021-02-22 DIAGNOSIS — F41.0 PANIC ATTACKS: ICD-10-CM

## 2021-02-22 PROCEDURE — 99214 OFFICE O/P EST MOD 30 MIN: CPT | Performed by: NURSE PRACTITIONER

## 2021-02-22 RX ORDER — LORAZEPAM 2 MG/1
2 TABLET ORAL DAILY PRN
Qty: 30 TABLET | Refills: 0 | Status: SHIPPED | OUTPATIENT
Start: 2021-02-22 | End: 2021-04-05 | Stop reason: SDUPTHER

## 2021-02-22 RX ORDER — FLUOXETINE 10 MG/1
10 CAPSULE ORAL DAILY
Qty: 30 CAPSULE | Refills: 3 | Status: SHIPPED | OUTPATIENT
Start: 2021-02-22 | End: 2021-07-14 | Stop reason: SDUPTHER

## 2021-02-22 ASSESSMENT — ENCOUNTER SYMPTOMS
DIARRHEA: 0
EYE PAIN: 0
EYE REDNESS: 0
TROUBLE SWALLOWING: 0
BACK PAIN: 0
RHINORRHEA: 0
SORE THROAT: 0
ALLERGIC/IMMUNOLOGIC NEGATIVE: 1
SHORTNESS OF BREATH: 0
COUGH: 0
CONSTIPATION: 0
VOMITING: 0
ABDOMINAL PAIN: 0
EYE DISCHARGE: 0
NAUSEA: 0
WHEEZING: 0

## 2021-02-22 NOTE — PROGRESS NOTES
[address] [address] [address]  [address]  [facility] [address]  Dept: [phone]  Dept Fax: [phone]  Loc: [phone]     Visit Date:  2/22/2021      Patient:  Mckay Ramirez  YOB: 1970    HPI:     Chief Complaint   Patient presents with    Panic Attack     has been having more panic attacks lately- gets dizzy and palpatations with these attacks. The patient experiences unexpected and at times recurrent panic attacks. The patient exhibits changes in behavior such as avoidance and excessive worry about the attacks and implications for normal functioning. The attacks may or may not occur in the context of general anxiety and are sometimes associated with extreme anxiety associated with physical symptoms. He was seen for this recently and given Ativan to use as needed and he states this works very well. He is not having any side effects from the medication and it does not interfere with his work. Patient states he has had a fairly long history of occasional panic attacks and has been on medications such as Klonopin and Atarax. He also states today that he has completely quit smoking due to fear of his COPD in the context of Covid. Medications    Current Outpatient Medications:     FLUoxetine (PROZAC) 10 MG capsule, Take 1 capsule by mouth daily, Disp: 30 capsule, Rfl: 3    LORazepam (ATIVAN) 2 MG tablet, Take 1 tablet by mouth daily as needed for Anxiety for up to 30 days. , Disp: 30 tablet, Rfl: 0    aspirin EC 81 MG EC tablet, Take 1 tablet by mouth daily. , Disp: 30 tablet, Rfl: 3    atorvastatin (LIPITOR) 10 MG tablet, Take 1 tablet by mouth daily (Patient not taking: Reported on 2/22/2021), Disp: 90 tablet, Rfl: 1    The patient is allergic to morphine. Past Medical History  Julianna Vigil  has a past medical history of Asthma and MI, old.     Subjective:      Review of Systems   Constitutional: Negative for activity change, fatigue and fever. HENT: Negative for congestion, ear pain, rhinorrhea, sore throat and trouble swallowing. Eyes: Negative for pain, discharge and redness. Respiratory: Negative for cough, shortness of breath and wheezing. Cardiovascular: Negative. Gastrointestinal: Negative for abdominal pain, constipation, diarrhea, nausea and vomiting. Endocrine: Negative. Genitourinary: Negative for dysuria, frequency and urgency. Musculoskeletal: Negative for arthralgias, back pain and myalgias. Skin: Negative for rash. Allergic/Immunologic: Negative. Neurological: Negative for dizziness, tremors, weakness and headaches. Hematological: Negative. Psychiatric/Behavioral: Negative for dysphoric mood and sleep disturbance. The patient is nervous/anxious. Objective:     /80   Pulse 80   Temp 97.6 °F (36.4 °C) (Temporal)   Resp 16   Ht 5' 7\" (1.702 m)   Wt 182 lb (82.6 kg)   BMI 28.51 kg/m²     Physical Exam  Constitutional:       General: He is not in acute distress. Appearance: He is well-developed. He is not diaphoretic. HENT:      Right Ear: External ear normal.      Left Ear: External ear normal.      Nose: Nose normal.   Eyes:      General:         Right eye: No discharge. Left eye: No discharge. Conjunctiva/sclera: Conjunctivae normal.      Pupils: Pupils are equal, round, and reactive to light. Neck:      Musculoskeletal: Normal range of motion. Vascular: No JVD. Cardiovascular:      Rate and Rhythm: Normal rate and regular rhythm. Heart sounds: Normal heart sounds. No murmur. Pulmonary:      Effort: Pulmonary effort is normal. No respiratory distress. Breath sounds: Normal breath sounds. Musculoskeletal: Normal range of motion. General: No tenderness or deformity. Skin:     General: Skin is warm and dry. Capillary Refill: Capillary refill takes less than 2 seconds. Coloration: Skin is not pale. Findings: No erythema or rash. Neurological:      Mental Status: He is alert and oriented to person, place, and time. Coordination: Coordination normal.   Psychiatric:         Behavior: Behavior normal.         Thought Content: Thought content normal.         Judgment: Judgment normal.         Assessment/Plan:      Edelmira Walker was seen today for panic attack. Diagnoses and all orders for this visit:    Anxiety  -     FLUoxetine (PROZAC) 10 MG capsule; Take 1 capsule by mouth daily  -     LORazepam (ATIVAN) 2 MG tablet; Take 1 tablet by mouth daily as needed for Anxiety for up to 30 days. Panic attacks  -     FLUoxetine (PROZAC) 10 MG capsule; Take 1 capsule by mouth daily  -     LORazepam (ATIVAN) 2 MG tablet; Take 1 tablet by mouth daily as needed for Anxiety for up to 30 days. Wellness examination  -     Basic Metabolic Panel; Future  -     Lipid, Fasting; Future  -     Hepatic Function Panel; Future  -     CBC Auto Differential; Future  -     Hemoglobin A1C; Future    I spent a good amount of time today talking with the patient about different antidepressants and how this would work in concert with occasionally taking Ativan if he has a panic attack. Patient is amenable to this and has never been on antidepressants. His daughter takes Prozac and he states it works extremely well for her and I suggested we try this from a genetic standpoint. If this does not seem to work well for him we will complete a Millennium test and then follow-up appropriately. Patient has a good insurance change in 2 months and we will pursue his annual physical, update as needed labs and see how he is doing on the medications. Return if symptoms worsen or fail to improve. Patient instructions given elisabeth.         Electronically signed by ROSAURA Hernandez CNP on 2/22/2021 at 3:01 PM

## 2021-03-20 ENCOUNTER — HOSPITAL ENCOUNTER (OUTPATIENT)
Age: 51
Discharge: HOME | End: 2021-03-20

## 2021-03-20 DIAGNOSIS — Z00.00 WELLNESS EXAMINATION: ICD-10-CM

## 2021-03-20 LAB
ALBUMIN SERPL-MCNC: 4 G/DL (ref 3.5–5.1)
ALP BLD-CCNC: 100 U/L (ref 38–126)
ALT SERPL-CCNC: 21 U/L (ref 11–66)
ANION GAP SERPL CALCULATED.3IONS-SCNC: 7 MEQ/L (ref 8–16)
AST SERPL-CCNC: 29 U/L (ref 5–40)
AVERAGE GLUCOSE: 114 MG/DL (ref 70–126)
BASOPHILS # BLD: 0.3 %
BASOPHILS ABSOLUTE: 0 THOU/MM3 (ref 0–0.1)
BILIRUB SERPL-MCNC: 0.4 MG/DL (ref 0.3–1.2)
BILIRUBIN DIRECT: < 0.2 MG/DL (ref 0–0.3)
BUN BLDV-MCNC: 16 MG/DL (ref 7–22)
CALCIUM SERPL-MCNC: 9.7 MG/DL (ref 8.5–10.5)
CHLORIDE BLD-SCNC: 101 MEQ/L (ref 98–111)
CHOLESTEROL, FASTING: 153 MG/DL (ref 100–199)
CO2: 29 MEQ/L (ref 23–33)
CREAT SERPL-MCNC: 0.6 MG/DL (ref 0.4–1.2)
EOSINOPHIL # BLD: 3.5 %
EOSINOPHILS ABSOLUTE: 0.2 THOU/MM3 (ref 0–0.4)
ERYTHROCYTE [DISTWIDTH] IN BLOOD BY AUTOMATED COUNT: 13.2 % (ref 11.5–14.5)
ERYTHROCYTE [DISTWIDTH] IN BLOOD BY AUTOMATED COUNT: 42.1 FL (ref 35–45)
GFR SERPL CREATININE-BSD FRML MDRD: > 90 ML/MIN/1.73M2
GLUCOSE BLD-MCNC: 93 MG/DL (ref 70–108)
HBA1C MFR BLD: 5.8 % (ref 4.4–6.4)
HCT VFR BLD CALC: 41.9 % (ref 42–52)
HDLC SERPL-MCNC: 41 MG/DL
HEMOGLOBIN: 13.9 GM/DL (ref 14–18)
IMMATURE GRANS (ABS): 0.02 THOU/MM3 (ref 0–0.07)
IMMATURE GRANULOCYTES: 0.3 %
LDL CHOLESTEROL CALCULATED: 95 MG/DL
LYMPHOCYTES # BLD: 39 %
LYMPHOCYTES ABSOLUTE: 2.2 THOU/MM3 (ref 1–4.8)
MCH RBC QN AUTO: 29 PG (ref 26–33)
MCHC RBC AUTO-ENTMCNC: 33.2 GM/DL (ref 32.2–35.5)
MCV RBC AUTO: 87.5 FL (ref 80–94)
MONOCYTES # BLD: 6.3 %
MONOCYTES ABSOLUTE: 0.4 THOU/MM3 (ref 0.4–1.3)
NUCLEATED RED BLOOD CELLS: 0 /100 WBC
PLATELET # BLD: 227 THOU/MM3 (ref 130–400)
PMV BLD AUTO: 10.7 FL (ref 9.4–12.4)
POTASSIUM SERPL-SCNC: 4.2 MEQ/L (ref 3.5–5.2)
RBC # BLD: 4.79 MILL/MM3 (ref 4.7–6.1)
SEG NEUTROPHILS: 50.6 %
SEGMENTED NEUTROPHILS ABSOLUTE COUNT: 2.9 THOU/MM3 (ref 1.8–7.7)
SODIUM BLD-SCNC: 137 MEQ/L (ref 135–145)
TOTAL PROTEIN: 7 G/DL (ref 6.1–8)
TRIGLYCERIDE, FASTING: 86 MG/DL (ref 0–199)
WBC # BLD: 5.7 THOU/MM3 (ref 4.8–10.8)

## 2021-03-20 PROCEDURE — 82248 BILIRUBIN DIRECT: CPT

## 2021-03-20 PROCEDURE — 36415 COLL VENOUS BLD VENIPUNCTURE: CPT

## 2021-03-20 PROCEDURE — 83036 HEMOGLOBIN GLYCOSYLATED A1C: CPT

## 2021-03-20 PROCEDURE — 85025 COMPLETE CBC W/AUTO DIFF WBC: CPT

## 2021-03-20 PROCEDURE — 80061 LIPID PANEL: CPT

## 2021-03-20 PROCEDURE — 80053 COMPREHEN METABOLIC PANEL: CPT

## 2021-04-02 DIAGNOSIS — F41.0 PANIC ATTACKS: ICD-10-CM

## 2021-04-02 DIAGNOSIS — F41.9 ANXIETY: ICD-10-CM

## 2021-04-02 NOTE — TELEPHONE ENCOUNTER
Patient requesting a medication refill.   Medication: LORazepam (ATIVAN) 2 MG tablet   Pharmacy: [facility]    Last office visit: 2/22/2021  Next office visit: Visit date not found

## 2021-04-05 RX ORDER — LORAZEPAM 2 MG/1
2 TABLET ORAL DAILY PRN
Qty: 30 TABLET | Refills: 0 | Status: SHIPPED | OUTPATIENT
Start: 2021-04-05 | End: 2021-07-14 | Stop reason: SDUPTHER

## 2021-07-14 ENCOUNTER — OFFICE VISIT (OUTPATIENT)
Dept: FAMILY MEDICINE CLINIC | Age: 51
End: 2021-07-14

## 2021-07-14 VITALS
DIASTOLIC BLOOD PRESSURE: 62 MMHG | OXYGEN SATURATION: 97 % | TEMPERATURE: 97.7 F | HEART RATE: 71 BPM | SYSTOLIC BLOOD PRESSURE: 112 MMHG | BODY MASS INDEX: 28.35 KG/M2 | WEIGHT: 180.6 LBS | HEIGHT: 67 IN

## 2021-07-14 DIAGNOSIS — F41.0 PANIC ATTACKS: ICD-10-CM

## 2021-07-14 DIAGNOSIS — F41.9 ANXIETY: ICD-10-CM

## 2021-07-14 DIAGNOSIS — E78.1 HYPERTRIGLYCERIDEMIA: ICD-10-CM

## 2021-07-14 DIAGNOSIS — Z23 NEED FOR TDAP VACCINATION: Primary | ICD-10-CM

## 2021-07-14 PROCEDURE — 99214 OFFICE O/P EST MOD 30 MIN: CPT | Performed by: NURSE PRACTITIONER

## 2021-07-14 RX ORDER — LORAZEPAM 2 MG/1
2 TABLET ORAL DAILY PRN
Qty: 30 TABLET | Refills: 0 | Status: SHIPPED | OUTPATIENT
Start: 2021-07-14 | End: 2021-10-29 | Stop reason: SDUPTHER

## 2021-07-14 RX ORDER — ATORVASTATIN CALCIUM 10 MG/1
10 TABLET, FILM COATED ORAL DAILY
Qty: 90 TABLET | Refills: 3 | Status: SHIPPED | OUTPATIENT
Start: 2021-07-14 | End: 2021-12-09

## 2021-07-14 RX ORDER — FLUOXETINE 10 MG/1
10 CAPSULE ORAL DAILY
Qty: 90 CAPSULE | Refills: 3 | Status: SHIPPED | OUTPATIENT
Start: 2021-07-14 | End: 2021-10-29 | Stop reason: SDUPTHER

## 2021-07-14 ASSESSMENT — ENCOUNTER SYMPTOMS
SORE THROAT: 0
RHINORRHEA: 0
EYE PAIN: 0
SHORTNESS OF BREATH: 0
TROUBLE SWALLOWING: 0
ABDOMINAL PAIN: 0
NAUSEA: 0
COUGH: 0
EYE REDNESS: 0
CONSTIPATION: 0
WHEEZING: 0
VOMITING: 0
ALLERGIC/IMMUNOLOGIC NEGATIVE: 1
EYE DISCHARGE: 0
BACK PAIN: 0
DIARRHEA: 0

## 2021-07-14 NOTE — PROGRESS NOTES
[address] [facility]  [address] [facility] [address]  Dept: [phone]  Dept Fax: [phone]  Loc: [phone]     Visit Date:  7/14/2021      Patient:  Ana Nino  YOB: 1970    HPI:     Chief Complaint   Patient presents with    Follow-Up from [id number] [place] [address] 7/13/21  HTN, increased confusion       HPI    Medications    Current Outpatient Medications:     FLUoxetine (PROZAC) 10 MG capsule, Take 1 capsule by mouth daily, Disp: 30 capsule, Rfl: 3    aspirin EC 81 MG EC tablet, Take 1 tablet by mouth daily. , Disp: 30 tablet, Rfl: 3    atorvastatin (LIPITOR) 10 MG tablet, Take 1 tablet by mouth daily (Patient not taking: Reported on 7/14/2021), Disp: 90 tablet, Rfl: 1    The patient is allergic to morphine. Past Medical History  Claudio Last  has a past medical history of Asthma and MI, old. Subjective:      Review of Systems    Objective:     /62   Pulse 71   Temp 97.7 °F (36.5 °C) (Oral)   Ht 5' 7\" (1.702 m)   Wt 180 lb 9.6 oz (81.9 kg)   SpO2 97%   BMI 28.29 kg/m²     Physical Exam    Assessment/Plan:      There are no diagnoses linked to this encounter. No follow-ups on file. Patient instructions given andreviewed.         Electronically signed by ROSAURA Childress CNP on 7/14/2021 at 10:41 AM

## 2021-07-14 NOTE — PROGRESS NOTES
[address]  [address] [facility] [place] [address]  Dept: [phone]  Dept Fax: [phone]: [phone]     Visit Date:  7/14/2021      Patient:  Shashank Vail  YOB: 1970    HPI:     Chief Complaint   Patient presents with    Follow-Up from [address] [facility] 7/13/21  HTN, increased confusion       Patient here following up from an emergency department visit he was discharged from yesterday where he had an episode of increased confusion while he was at work. No definitive diagnosis was made but it seemed that his blood pressure was up at the time and he wanted to follow-up and be sure that it was not continuing. Patient states he feels fine today. Also needs refills on his medications stating that he occasionally is getting good success taking his Ativan for sensations of panic attacks. Medications    Current Outpatient Medications:     atorvastatin (LIPITOR) 10 MG tablet, Take 1 tablet by mouth daily, Disp: 90 tablet, Rfl: 3    FLUoxetine (PROZAC) 10 MG capsule, Take 1 capsule by mouth daily, Disp: 90 capsule, Rfl: 3    LORazepam (ATIVAN) 2 MG tablet, Take 1 tablet by mouth daily as needed for Anxiety for up to 30 days. , Disp: 30 tablet, Rfl: 0    aspirin EC 81 MG EC tablet, Take 1 tablet by mouth daily. , Disp: 30 tablet, Rfl: 3    The patient is allergic to morphine. Past Medical History  Mckenna Nobles  has a past medical history of Asthma and MI, old. Subjective:      Review of Systems   Constitutional: Negative for activity change, fatigue and fever. HENT: Negative for congestion, ear pain, rhinorrhea, sore throat and trouble swallowing. Eyes: Negative for pain, discharge and redness. Respiratory: Negative for cough, shortness of breath and wheezing. Cardiovascular: Negative. Gastrointestinal: Negative for abdominal pain, constipation, diarrhea, nausea and vomiting. Endocrine: Negative. Genitourinary: Negative for dysuria, frequency and urgency. Musculoskeletal: Negative for arthralgias, back pain and myalgias. Skin: Negative for rash. Allergic/Immunologic: Negative. Neurological: Negative for dizziness, tremors, weakness and headaches. Hematological: Negative. Psychiatric/Behavioral: Negative for dysphoric mood and sleep disturbance. The patient is not nervous/anxious. Objective:     /62   Pulse 71   Temp 97.7 °F (36.5 °C) (Oral)   Ht 5' 7\" (1.702 m)   Wt 180 lb 9.6 oz (81.9 kg)   SpO2 97%   BMI 28.29 kg/m²     Physical Exam  Constitutional:       General: He is not in acute distress. Appearance: He is well-developed. He is not diaphoretic. HENT:      Right Ear: External ear normal.      Left Ear: External ear normal.      Nose: Nose normal.      Mouth/Throat:      Mouth: Mucous membranes are moist.   Eyes:      General:         Right eye: No discharge. Left eye: No discharge. Conjunctiva/sclera: Conjunctivae normal.      Pupils: Pupils are equal, round, and reactive to light. Neck:      Vascular: No JVD. Cardiovascular:      Rate and Rhythm: Normal rate and regular rhythm. Heart sounds: Normal heart sounds. No murmur heard. Pulmonary:      Effort: Pulmonary effort is normal. No respiratory distress. Breath sounds: Normal breath sounds. Musculoskeletal:         General: No tenderness or deformity. Normal range of motion. Cervical back: Normal range of motion. Skin:     General: Skin is warm and dry. Capillary Refill: Capillary refill takes less than 2 seconds. Coloration: Skin is not pale. Findings: No erythema or rash. Neurological:      Mental Status: He is alert and oriented to person, place, and time. Coordination: Coordination normal.   Psychiatric:         Behavior: Behavior normal.         Thought Content:  Thought content normal.         Judgment: Judgment normal.         Assessment/Plan: Deondre Diop was seen today for follow-up from hospital.    Diagnoses and all orders for this visit:    Need for Tdap vaccination  -     Tdap (age 6y and older) IM (BOOSTRIX)    Hypertriglyceridemia  -     atorvastatin (LIPITOR) 10 MG tablet; Take 1 tablet by mouth daily    Anxiety  -     FLUoxetine (PROZAC) 10 MG capsule; Take 1 capsule by mouth daily  -     LORazepam (ATIVAN) 2 MG tablet; Take 1 tablet by mouth daily as needed for Anxiety for up to 30 days. Panic attacks  -     FLUoxetine (PROZAC) 10 MG capsule; Take 1 capsule by mouth daily  -     LORazepam (ATIVAN) 2 MG tablet; Take 1 tablet by mouth daily as needed for Anxiety for up to 30 days. Return in about 3 months (around 10/14/2021). Patient instructions given elisabeth.         Electronically signed by ROSAURA Melchor CNP on 7/14/2021 at 12:50 PM

## 2021-10-29 ENCOUNTER — OFFICE VISIT (OUTPATIENT)
Dept: FAMILY MEDICINE CLINIC | Age: 51
End: 2021-10-29
Payer: COMMERCIAL

## 2021-10-29 VITALS
OXYGEN SATURATION: 98 % | TEMPERATURE: 98.5 F | SYSTOLIC BLOOD PRESSURE: 130 MMHG | DIASTOLIC BLOOD PRESSURE: 80 MMHG | HEART RATE: 75 BPM | WEIGHT: 193.8 LBS | BODY MASS INDEX: 30.35 KG/M2 | RESPIRATION RATE: 16 BRPM

## 2021-10-29 DIAGNOSIS — F41.0 PANIC ATTACKS: ICD-10-CM

## 2021-10-29 DIAGNOSIS — Z20.822 ENCOUNTER FOR SCREENING LABORATORY TESTING FOR COVID-19 VIRUS: ICD-10-CM

## 2021-10-29 DIAGNOSIS — Z23 FLU VACCINE NEED: ICD-10-CM

## 2021-10-29 DIAGNOSIS — F41.9 ANXIETY: Primary | ICD-10-CM

## 2021-10-29 PROCEDURE — 90471 IMMUNIZATION ADMIN: CPT | Performed by: NURSE PRACTITIONER

## 2021-10-29 PROCEDURE — 90674 CCIIV4 VAC NO PRSV 0.5 ML IM: CPT | Performed by: NURSE PRACTITIONER

## 2021-10-29 PROCEDURE — 99214 OFFICE O/P EST MOD 30 MIN: CPT | Performed by: NURSE PRACTITIONER

## 2021-10-29 RX ORDER — FLUOXETINE 10 MG/1
10 CAPSULE ORAL DAILY
Qty: 90 CAPSULE | Refills: 3 | Status: SHIPPED | OUTPATIENT
Start: 2021-10-29 | End: 2021-12-09 | Stop reason: SINTOL

## 2021-10-29 RX ORDER — LORAZEPAM 2 MG/1
TABLET ORAL
COMMUNITY
Start: 2021-08-02 | End: 2021-10-29

## 2021-10-29 RX ORDER — LORAZEPAM 2 MG/1
TABLET ORAL
Status: CANCELLED | OUTPATIENT
Start: 2021-10-29

## 2021-10-29 RX ORDER — LORAZEPAM 2 MG/1
2 TABLET ORAL DAILY PRN
Qty: 30 TABLET | Refills: 0 | Status: SHIPPED | OUTPATIENT
Start: 2021-10-29 | End: 2022-01-18 | Stop reason: SDUPTHER

## 2021-10-29 SDOH — ECONOMIC STABILITY: FOOD INSECURITY: WITHIN THE PAST 12 MONTHS, YOU WORRIED THAT YOUR FOOD WOULD RUN OUT BEFORE YOU GOT MONEY TO BUY MORE.: NEVER TRUE

## 2021-10-29 SDOH — ECONOMIC STABILITY: FOOD INSECURITY: WITHIN THE PAST 12 MONTHS, THE FOOD YOU BOUGHT JUST DIDN'T LAST AND YOU DIDN'T HAVE MONEY TO GET MORE.: NEVER TRUE

## 2021-10-29 ASSESSMENT — ENCOUNTER SYMPTOMS
TROUBLE SWALLOWING: 0
SHORTNESS OF BREATH: 0
ALLERGIC/IMMUNOLOGIC NEGATIVE: 1
EYE REDNESS: 0
EYE DISCHARGE: 0
SORE THROAT: 0
EYE PAIN: 0
CONSTIPATION: 0
VOMITING: 0
ABDOMINAL PAIN: 0
RHINORRHEA: 0
DIARRHEA: 0
WHEEZING: 0
BACK PAIN: 0
COUGH: 0
NAUSEA: 0

## 2021-10-29 ASSESSMENT — SOCIAL DETERMINANTS OF HEALTH (SDOH): HOW HARD IS IT FOR YOU TO PAY FOR THE VERY BASICS LIKE FOOD, HOUSING, MEDICAL CARE, AND HEATING?: NOT HARD AT ALL

## 2021-10-29 NOTE — PROGRESS NOTES
Immunizations Administered     Name Date Dose Route    Influenza, MDCK Quadv, IM, PF (Flucelvax 2 yrs and older) 10/29/2021 0.5 mL Intramuscular    Site: Deltoid- Left    Lot: [id number]    NDC: 33251-820-00

## 2021-10-29 NOTE — PROGRESS NOTES
[address] [address] [address]  [facility] [address]  Dept: [phone]  Dept Fax: [phone]  Loc: [phone]     Visit Date:  10/29/2021      Patient:  Dhaval Coombs  YOB: 1970    HPI:     Chief Complaint   Patient presents with    3 Month Follow-Up     CAD, anxiety,     Immunizations     discuss covid booster, flu shot today       Patient presents with 3-month follow-up on his anxiety and medication refill. Patient also wants to discuss Covid vaccine and get his flu shot today. States current dosing of his Ativan is adequate and he takes it periodically and not regularly, with no side effects or indications of addiction. Continues to work 12 hours a day 6 days a week for [employer]. Medications    Current Outpatient Medications:     LORazepam (ATIVAN) 2 MG tablet, Take 1 tablet by mouth daily as needed for Anxiety for up to 30 days. , Disp: 30 tablet, Rfl: 0    FLUoxetine (PROZAC) 10 MG capsule, Take 1 capsule by mouth daily, Disp: 90 capsule, Rfl: 3    atorvastatin (LIPITOR) 10 MG tablet, Take 1 tablet by mouth daily, Disp: 90 tablet, Rfl: 3    aspirin EC 81 MG EC tablet, Take 1 tablet by mouth daily. , Disp: 30 tablet, Rfl: 3    The patient is allergic to morphine. Past Medical History  Garett Naranjo  has a past medical history of Asthma and MI, old. Subjective:      Review of Systems   Constitutional: Negative for activity change, fatigue and fever. HENT: Negative for congestion, ear pain, rhinorrhea, sore throat and trouble swallowing. Eyes: Negative for pain, discharge and redness. Respiratory: Negative for cough, shortness of breath and wheezing. Cardiovascular: Negative. Gastrointestinal: Negative for abdominal pain, constipation, diarrhea, nausea and vomiting. Endocrine: Negative. Genitourinary: Negative for dysuria, frequency and urgency.    Musculoskeletal: Negative for arthralgias, back pain and myalgias. Skin: Negative for rash. Allergic/Immunologic: Negative. Neurological: Negative for dizziness, tremors, weakness and headaches. Hematological: Negative. Psychiatric/Behavioral: Negative for dysphoric mood and sleep disturbance. The patient is not nervous/anxious. Objective:     /80 (Site: Right Upper Arm)   Pulse 75   Temp 98.5 °F (36.9 °C) (Oral)   Resp 16   Wt 193 lb 12.8 oz (87.9 kg)   SpO2 98%   BMI 30.35 kg/m²     Physical Exam  Constitutional:       General: He is not in acute distress. Appearance: He is well-developed. He is not diaphoretic. HENT:      Right Ear: External ear normal.      Left Ear: External ear normal.      Nose: Nose normal.      Mouth/Throat:      Mouth: Mucous membranes are moist.   Eyes:      General:         Right eye: No discharge. Left eye: No discharge. Conjunctiva/sclera: Conjunctivae normal.      Pupils: Pupils are equal, round, and reactive to light. Neck:      Vascular: No JVD. Cardiovascular:      Rate and Rhythm: Normal rate and regular rhythm. Heart sounds: Normal heart sounds. No murmur heard. Pulmonary:      Effort: Pulmonary effort is normal. No respiratory distress. Breath sounds: Normal breath sounds. Musculoskeletal:         General: No tenderness or deformity. Normal range of motion. Cervical back: Normal range of motion. Skin:     General: Skin is warm and dry. Capillary Refill: Capillary refill takes less than 2 seconds. Coloration: Skin is not pale. Findings: No erythema or rash. Neurological:      Mental Status: He is alert and oriented to person, place, and time. Coordination: Coordination normal.   Psychiatric:         Behavior: Behavior normal.         Thought Content: Thought content normal.         Judgment: Judgment normal.         Assessment/Plan:      Rebecca La was seen today for 3 month follow-up and immunizations.     Diagnoses and all orders for this visit:    Anxiety  -     LORazepam (ATIVAN) 2 MG tablet; Take 1 tablet by mouth daily as needed for Anxiety for up to 30 days.  -     FLUoxetine (PROZAC) 10 MG capsule; Take 1 capsule by mouth daily    Panic attacks  -     LORazepam (ATIVAN) 2 MG tablet; Take 1 tablet by mouth daily as needed for Anxiety for up to 30 days.  -     FLUoxetine (PROZAC) 10 MG capsule; Take 1 capsule by mouth daily    Flu vaccine need  -     INFLUENZA, MDCK QUADV, 2 YRS AND OLDER, IM, PF, PREFILL SYR OR SDV, 0.5ML (FLUCELVAX QUADV, PF)    Encounter for screening laboratory testing for COVID-19 virus  -     Covid-19, Antibody, Total; Future        Return in 3 months (on 1/29/2022). Patient instructions given maria guadaluped.         Electronically signed by ROSAURA Law CNP on 10/29/2021 at 12:30 PM

## 2021-12-09 ENCOUNTER — OFFICE VISIT (OUTPATIENT)
Dept: FAMILY MEDICINE CLINIC | Age: 51
End: 2021-12-09

## 2021-12-09 VITALS
BODY MASS INDEX: 31.32 KG/M2 | HEART RATE: 70 BPM | OXYGEN SATURATION: 98 % | RESPIRATION RATE: 18 BRPM | SYSTOLIC BLOOD PRESSURE: 132 MMHG | WEIGHT: 200 LBS | DIASTOLIC BLOOD PRESSURE: 72 MMHG | TEMPERATURE: 98.5 F

## 2021-12-09 DIAGNOSIS — M43.6 ACUTE TORTICOLLIS: Primary | ICD-10-CM

## 2021-12-09 PROCEDURE — 99214 OFFICE O/P EST MOD 30 MIN: CPT | Performed by: NURSE PRACTITIONER

## 2021-12-09 RX ORDER — BACLOFEN 10 MG/1
10 TABLET ORAL 3 TIMES DAILY
Qty: 30 TABLET | Refills: 0 | Status: SHIPPED | OUTPATIENT
Start: 2021-12-09 | End: 2022-08-03 | Stop reason: SDUPTHER

## 2021-12-09 RX ORDER — PREDNISONE 20 MG/1
20 TABLET ORAL 2 TIMES DAILY
Qty: 10 TABLET | Refills: 0 | Status: SHIPPED | OUTPATIENT
Start: 2021-12-09 | End: 2021-12-14

## 2021-12-09 RX ORDER — MELOXICAM 15 MG/1
15 TABLET ORAL DAILY PRN
Qty: 30 TABLET | Refills: 2 | Status: SHIPPED | OUTPATIENT
Start: 2021-12-09

## 2021-12-09 ASSESSMENT — ENCOUNTER SYMPTOMS
SHORTNESS OF BREATH: 0
ABDOMINAL PAIN: 0
TROUBLE SWALLOWING: 0
NAUSEA: 0
SORE THROAT: 0
EYE PAIN: 0
EYE REDNESS: 0
COUGH: 0
EYE DISCHARGE: 0
ALLERGIC/IMMUNOLOGIC NEGATIVE: 1
WHEEZING: 0
CONSTIPATION: 0
DIARRHEA: 0
BACK PAIN: 0
VOMITING: 0
RHINORRHEA: 0

## 2021-12-09 NOTE — PROGRESS NOTES
[address] [address] [address]  [facility] [address]  Dept: [phone]  Dept Fax: [phone]  Loc: [phone]     Visit Date:  12/9/2021      Patient:  Curtis Tafoya  YOB: 1970    HPI:     Chief Complaint   Patient presents with    Neck Pain     hx of neck surgery 10yrs ago-meloxicam has worked for pain in the past, pulled muscle in neck, used carin and advil with some relief, also c/o right hip pain when waking    Health Maintenance       Presents with 2 days of left-sided neck pain and stiffness that radiates down into his left shoulder. 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 He has a history of neck surgery about 10 years ago which was successful. 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 Is requesting a prescription for Mobic which he states works very well. No pain radiation down his arm. Medications    Current Outpatient Medications:     predniSONE (DELTASONE) 20 MG tablet, Take 1 tablet by mouth 2 times daily for 5 days, Disp: 10 tablet, Rfl: 0    meloxicam (MOBIC) 15 MG tablet, Take 1 tablet by mouth daily as needed for Pain, Disp: 30 tablet, Rfl: 2    baclofen (LIORESAL) 10 MG tablet, Take 1 tablet by mouth 3 times daily for 10 days, Disp: 30 tablet, Rfl: 0    aspirin EC 81 MG EC tablet, Take 1 tablet by mouth daily. , Disp: 30 tablet, Rfl: 3    The patient is allergic to morphine. Past Medical History  Rey Javier  has a past medical history of Asthma and MI, old. Subjective:      Review of Systems   Constitutional: Negative for activity change, fatigue and fever. HENT: Negative for congestion, ear pain, rhinorrhea, sore throat and trouble swallowing. Eyes: Negative for pain, discharge and redness. Respiratory: Negative for cough, shortness of breath and wheezing.     Cardiovascular: Negative. Gastrointestinal: Negative for abdominal pain, constipation, diarrhea, nausea and vomiting. Endocrine: Negative. Genitourinary: Negative for dysuria, frequency and urgency. Musculoskeletal: Positive for myalgias, neck pain and neck stiffness. Negative for arthralgias and back pain. Skin: Negative for rash. Allergic/Immunologic: Negative. Neurological: Negative for dizziness, tremors, weakness and headaches. Hematological: Negative. Psychiatric/Behavioral: Negative for dysphoric mood and sleep disturbance. The patient is not nervous/anxious. Objective:     /72 (Site: Right Upper Arm)   Pulse 70   Temp 98.5 °F (36.9 °C) (Oral)   Resp 18   Wt 200 lb (90.7 kg)   SpO2 98%   BMI 31.32 kg/m²     Physical Exam  Constitutional:       General: He is not in acute distress. Appearance: He is well-developed. He is not diaphoretic. HENT:      Right Ear: External ear normal.      Left Ear: External ear normal.      Nose: Nose normal.   Eyes:      General:         Right eye: No discharge. Left eye: No discharge. Conjunctiva/sclera: Conjunctivae normal.      Pupils: Pupils are equal, round, and reactive to light. Neck:      Vascular: No JVD. Cardiovascular:      Rate and Rhythm: Normal rate and regular rhythm. Pulmonary:      Effort: Pulmonary effort is normal. No respiratory distress. Musculoskeletal:         General: Tenderness present. No swelling, deformity or signs of injury. Normal range of motion. Cervical back: Normal range of motion. Skin:     General: Skin is warm and dry. Capillary Refill: Capillary refill takes less than 2 seconds. Coloration: Skin is not pale. Findings: No erythema or rash. Neurological:      Mental Status: He is alert and oriented to person, place, and time. Coordination: Coordination normal.   Psychiatric:         Behavior: Behavior normal.         Thought Content:  Thought content normal. Judgment: Judgment normal.         Assessment/Plan:      Boo Dela Cruz was seen today for neck pain and health maintenance. Diagnoses and all orders for this visit:    Acute torticollis  -     predniSONE (DELTASONE) 20 MG tablet; Take 1 tablet by mouth 2 times daily for 5 days  -     meloxicam (MOBIC) 15 MG tablet; Take 1 tablet by mouth daily as needed for Pain  -     baclofen (LIORESAL) 10 MG tablet; Take 1 tablet by mouth 3 times daily for 10 days        Return if symptoms worsen or fail to improve. Patient instructions given andreviewed.         Electronically signed by ROSAURA Dumont CNP on 12/9/2021 at 8:06 AM

## 2022-01-18 DIAGNOSIS — F41.0 PANIC ATTACKS: ICD-10-CM

## 2022-01-18 DIAGNOSIS — F41.9 ANXIETY: ICD-10-CM

## 2022-01-18 RX ORDER — LORAZEPAM 2 MG/1
2 TABLET ORAL DAILY PRN
Qty: 30 TABLET | Refills: 1 | Status: SHIPPED | OUTPATIENT
Start: 2022-01-18 | End: 2022-08-03 | Stop reason: SDUPTHER

## 2022-01-18 NOTE — TELEPHONE ENCOUNTER
----- Message from [facility] sent at 1/18/2022  4:12 PM EST -----  Subject: Refill Request    QUESTIONS  Name of Medication? LORazepam (ATIVAN) 2 MG tablet  Patient-reported dosage and instructions? as needed   How many days do you have left? 0  Preferred Pharmacy? [id number] phone number (if available)? [phone]  ---------------------------------------------------------------------------  --------------  CALL BACK INFO  What is the best way for the office to contact you? OK to leave message on   voicemail  Preferred Call Back Phone Number?  [phone]

## 2022-01-26 ENCOUNTER — TELEPHONE (OUTPATIENT)
Dept: FAMILY MEDICINE CLINIC | Age: 52
End: 2022-01-26

## 2022-01-26 DIAGNOSIS — F17.200 HAS BEEN SMOKING FOR 30 YEARS: Primary | ICD-10-CM

## 2022-01-26 NOTE — TELEPHONE ENCOUNTER
----- Message from Ashley Baker sent at 1/26/2022 12:02 PM EST -----  Subject: Referral Request    QUESTIONS   Reason for referral request? Pt is requesting a lower CT Pulmonary scan. Pt already spoke with Dr. Luis Fernando Nayak about this. Pt had covid and has been   having issues since then. Pt now has his insurance card and is ready for   scheduling. Please send referral as soon as possible for pt. Please   contact pt when ready to be scheduled. Has the physician seen you for this condition before? Yes  Select a date? 2021-12-09  Select the Provider the patient wants to be referred to, if known (PCP or   Specialist)? Siri Feng   Preferred Specialist (if applicable)? Do you already have an appointment scheduled? No  Additional Information for Provider?   ---------------------------------------------------------------------------  --------------  CALL BACK INFO  What is the best way for the office to contact you? OK to leave message on   voicemail  Preferred Call Back Phone Number?  [phone]
Patient called and notified. States understanding. No questions or concerns at this time.
That would be the low-dose CT screening scan due to smoking for over 30 years.
What are we ordering? Diagnosis?
18-May-2019 12:46

## 2022-01-28 ENCOUNTER — TELEPHONE (OUTPATIENT)
Dept: FAMILY MEDICINE CLINIC | Age: 52
End: 2022-01-28

## 2022-01-28 NOTE — TELEPHONE ENCOUNTER
Central Scheduling called and states that patient was ordered a CT and patient is not over the age of 56 so they will not schedule. Talked to Katt Figures and he is ok with patient not completing the CT at this time. Patient states understanding.  No other questions or concerns at this time

## 2022-04-14 ENCOUNTER — HOSPITAL ENCOUNTER (EMERGENCY)
Age: 52
Discharge: HOME | End: 2022-04-14
Payer: COMMERCIAL

## 2022-04-14 VITALS
SYSTOLIC BLOOD PRESSURE: 148 MMHG | RESPIRATION RATE: 18 BRPM | DIASTOLIC BLOOD PRESSURE: 95 MMHG | HEART RATE: 77 BPM | OXYGEN SATURATION: 99 % | TEMPERATURE: 97.6 F

## 2022-04-14 DIAGNOSIS — H65.02 NON-RECURRENT ACUTE SEROUS OTITIS MEDIA OF LEFT EAR: Primary | ICD-10-CM

## 2022-04-14 DIAGNOSIS — M70.22 OLECRANON BURSITIS OF LEFT ELBOW: ICD-10-CM

## 2022-04-14 DIAGNOSIS — J02.9 ACUTE PHARYNGITIS, UNSPECIFIED ETIOLOGY: ICD-10-CM

## 2022-04-14 PROCEDURE — 99203 OFFICE O/P NEW LOW 30 MIN: CPT

## 2022-04-14 PROCEDURE — 99214 OFFICE O/P EST MOD 30 MIN: CPT | Performed by: NURSE PRACTITIONER

## 2022-04-14 RX ORDER — LIDOCAINE HYDROCHLORIDE 20 MG/ML
5 SOLUTION OROPHARYNGEAL PRN
Qty: 100 ML | Refills: 0 | Status: SHIPPED | OUTPATIENT
Start: 2022-04-14 | End: 2022-04-17

## 2022-04-14 RX ORDER — LORAZEPAM 0.5 MG/1
0.5 TABLET ORAL EVERY 6 HOURS PRN
COMMUNITY
End: 2022-08-03 | Stop reason: ALTCHOICE

## 2022-04-14 RX ORDER — PREDNISONE 20 MG/1
20 TABLET ORAL 2 TIMES DAILY
Qty: 10 TABLET | Refills: 0 | Status: SHIPPED | OUTPATIENT
Start: 2022-04-14 | End: 2022-04-19

## 2022-04-14 RX ORDER — DOXYCYCLINE HYCLATE 100 MG
100 TABLET ORAL 2 TIMES DAILY
Qty: 20 TABLET | Refills: 0 | Status: SHIPPED | OUTPATIENT
Start: 2022-04-14 | End: 2022-04-24

## 2022-04-14 ASSESSMENT — ENCOUNTER SYMPTOMS
BACK PAIN: 0
VOMITING: 0
RHINORRHEA: 0
EYE REDNESS: 0
COUGH: 0
WHEEZING: 0
CONSTIPATION: 0
DIARRHEA: 0
EYE DISCHARGE: 0
TROUBLE SWALLOWING: 0
SORE THROAT: 1
EYE PAIN: 0
NAUSEA: 0
SHORTNESS OF BREATH: 0
ABDOMINAL PAIN: 0
ALLERGIC/IMMUNOLOGIC NEGATIVE: 1

## 2022-04-14 ASSESSMENT — PAIN - FUNCTIONAL ASSESSMENT: PAIN_FUNCTIONAL_ASSESSMENT: 0-10

## 2022-04-14 ASSESSMENT — PAIN DESCRIPTION - FREQUENCY: FREQUENCY: CONTINUOUS

## 2022-04-14 ASSESSMENT — PAIN DESCRIPTION - DESCRIPTORS: DESCRIPTORS: ACHING

## 2022-04-14 ASSESSMENT — PAIN SCALES - GENERAL: PAINLEVEL_OUTOF10: 9

## 2022-04-14 ASSESSMENT — PAIN DESCRIPTION - PROGRESSION: CLINICAL_PROGRESSION: NOT CHANGED

## 2022-04-14 ASSESSMENT — PAIN DESCRIPTION - ONSET: ONSET: ON-GOING

## 2022-04-14 ASSESSMENT — PAIN DESCRIPTION - PAIN TYPE: TYPE: ACUTE PAIN

## 2022-04-14 ASSESSMENT — PAIN DESCRIPTION - LOCATION: LOCATION: THROAT

## 2022-04-14 NOTE — ED PROVIDER NOTES
[facility]      CHIEF COMPLAINT       Chief Complaint   Patient presents with    Pharyngitis       Nurses Notes reviewed and I agree except as noted in the HPI. HISTORY OF PRESENT ILLNESS   Andrzej King is a 54 y.o. male who is also a primary care patient of this provider. He presents today with complaint of sore throat for about 8 to 9 days, then the development of left otalgia in the last 2 days. He also has had several days of left elbow pain that he can feel radiating down into his hand. That pain is worse with twisting and bending of his elbow. REVIEW OF SYSTEMS     Review of Systems   Constitutional: Positive for fatigue. Negative for activity change and fever. HENT: Positive for congestion, ear pain and sore throat. Negative for rhinorrhea and trouble swallowing. Eyes: Negative for pain, discharge and redness. Respiratory: Negative for cough, shortness of breath and wheezing. Cardiovascular: Negative. Gastrointestinal: Negative for abdominal pain, constipation, diarrhea, nausea and vomiting. Endocrine: Negative. Genitourinary: Negative for dysuria, frequency and urgency. Musculoskeletal: Positive for arthralgias. Negative for back pain and myalgias. Skin: Negative for rash. Allergic/Immunologic: Negative. Neurological: Negative for dizziness, tremors, weakness and headaches. Hematological: Negative. Psychiatric/Behavioral: Negative for dysphoric mood and sleep disturbance. The patient is not nervous/anxious. PAST MEDICAL HISTORY         Diagnosis Date    Asthma 48    COVID     MI, old 04/30/2012       SURGICAL HISTORY     Patient  has a past surgical history that includes Cardiac catheterization (4/2012); Inner ear surgery (2008); Cervical spine surgery (2009); and Appendectomy (1985).     CURRENT MEDICATIONS       Discharge Medication List as of 4/14/2022  5:03 PM      CONTINUE these medications which have NOT CHANGED    Details   LORazepam (ATIVAN) 0.5 MG tablet Take 0.5 mg by mouth every 6 hours as needed for Anxiety. Historical Med      meloxicam (MOBIC) 15 MG tablet Take 1 tablet by mouth daily as needed for Pain, Disp-30 tablet, R-2Normal      aspirin EC 81 MG EC tablet Take 1 tablet by mouth daily. , Disp-30 tablet, R-3             ALLERGIES     Patient is is allergic to morphine. FAMILY HISTORY     Patient'sfamily history includes Alcohol Abuse in his father; Alzheimer's Disease in his paternal aunt; Arthritis in his mother; COPD (age of onset: 62) in his father; Cancer in his father, paternal aunt, paternal aunt, and paternal uncle; Cancer (age of onset: 61) in his maternal aunt and maternal grandfather; Coronary Art Dis in his paternal grandfather; Diabetes in his paternal uncle; Heart Attack in his paternal grandfather; Heart Disease in his paternal grandfather; Hypertension (age of onset: 54) in his father; Other in his paternal uncle; Parkinsonism in his paternal uncle; Stroke in his paternal aunt. SOCIAL HISTORY     Patient  reports that he quit smoking about 14 months ago. He has a 45.00 pack-year smoking history. He has never used smokeless tobacco. He reports that he does not drink alcohol and does not use drugs. PHYSICAL EXAM     ED TRIAGE VITALS  BP: (!) 148/95, Temp: 97.6 °F (36.4 °C), Pulse: 77, Resp: 18, SpO2: 99 %  Physical Exam  Vitals reviewed. Constitutional:       General: He is not in acute distress. Appearance: Normal appearance. He is well-developed. He is ill-appearing. He is not toxic-appearing or diaphoretic. HENT:      Head: Normocephalic. No right periorbital erythema or left periorbital erythema. Jaw: No trismus. Right Ear: Hearing, tympanic membrane, ear canal and external ear normal. No middle ear effusion. Tympanic membrane is not erythematous. Left Ear: Hearing, ear canal and external ear normal. A middle ear effusion is present.  Tympanic membrane is erythematous. Nose: Nose normal. No mucosal edema or rhinorrhea. Mouth/Throat:      Mouth: Mucous membranes are moist. No oral lesions. Dentition: Normal dentition. Pharynx: Uvula midline. Posterior oropharyngeal erythema present. No oropharyngeal exudate. Tonsils: No tonsillar exudate. 0 on the right. 0 on the left. Eyes:      General: Lids are normal.         Right eye: No discharge. Left eye: No discharge. Conjunctiva/sclera: Conjunctivae normal.      Right eye: Right conjunctiva is not injected. No chemosis. Left eye: No chemosis. Pupils: Pupils are equal, round, and reactive to light. Neck:      Vascular: No JVD. Trachea: Trachea normal.   Cardiovascular:      Rate and Rhythm: Normal rate and regular rhythm. Heart sounds: Normal heart sounds. No murmur heard. Pulmonary:      Effort: Pulmonary effort is normal. No respiratory distress. Breath sounds: Normal breath sounds. No stridor. No wheezing. Abdominal:      General: Bowel sounds are normal. There is no distension. Palpations: Abdomen is soft. Tenderness: There is no abdominal tenderness. Musculoskeletal:         General: No tenderness or signs of injury. Normal range of motion. Left elbow: No swelling, deformity, effusion or lacerations. Normal range of motion. No tenderness. Arms:       Cervical back: Full passive range of motion without pain and normal range of motion. Comments: Tenderness with palpation   Lymphadenopathy:      Cervical: No cervical adenopathy. Skin:     General: Skin is warm and dry. Capillary Refill: Capillary refill takes less than 2 seconds. Findings: No rash. Neurological:      Mental Status: He is alert and oriented to person, place, and time. GCS: GCS eye subscore is 4. GCS verbal subscore is 5. GCS motor subscore is 6. Cranial Nerves: No cranial nerve deficit.       Coordination: Coordination normal.      Gait: Gait normal.   Psychiatric:         Mood and Affect: Mood is not anxious or depressed. Speech: Speech normal.         Behavior: Behavior normal. Behavior is not withdrawn or hyperactive. Behavior is cooperative. Thought Content: Thought content normal.         Judgment: Judgment normal.         DIAGNOSTIC RESULTS   Labs: No results found for this visit on 04/14/22. IMAGING:    URGENT CARE COURSE:     Vitals:    04/14/22 1645   BP: (!) 148/95   Pulse: 77   Resp: 18   Temp: 97.6 °F (36.4 °C)   SpO2: 99%       Medications - No data to display  PROCEDURES:  None  FINAL IMPRESSION      1. Non-recurrent acute serous otitis media of left ear    2. Acute pharyngitis, unspecified etiology    3. Olecranon bursitis of left elbow        DISPOSITION/PLAN   DISPOSITION Decision To Discharge 04/14/2022 05:00:45 PM    PATIENT REFERRED TO:  ROSAURA Mayo CNP  [address] [place] [address] [address] [facility]  [phone]      As needed    DISCHARGE MEDICATIONS:  Discharge Medication List as of 4/14/2022  5:03 PM      START taking these medications    Details   doxycycline hyclate (VIBRA-TABS) 100 MG tablet Take 1 tablet by mouth 2 times daily for 10 days, Disp-20 tablet, R-0Normal      predniSONE (DELTASONE) 20 MG tablet Take 1 tablet by mouth 2 times daily for 5 days, Disp-10 tablet, R-0Normal      lidocaine viscous hcl (XYLOCAINE) 2 % SOLN solution Take 5 mLs by mouth as needed for Irritation, Disp-100 mL, R-0Normal           Discharge Medication List as of 4/14/2022  5:03 PM          ROSAURA Mayo CNP, APRN - CNP  04/14/22 [id number]

## 2022-04-14 NOTE — ED TRIAGE NOTES
To room with c/o sore throat and left sided ear pain since last Tuesday. \"This is the worse sore throat of his life. Wed- Sunday mostly slept. Productive cough with bloody sputum. PE test due to \"feeling uncomfortable to breath. \" Which was normal.  6 weeks ago at [facility].

## 2022-08-03 ENCOUNTER — OFFICE VISIT (OUTPATIENT)
Dept: FAMILY MEDICINE CLINIC | Age: 52
End: 2022-08-03
Payer: COMMERCIAL

## 2022-08-03 VITALS
HEART RATE: 80 BPM | HEIGHT: 67 IN | BODY MASS INDEX: 31.08 KG/M2 | SYSTOLIC BLOOD PRESSURE: 138 MMHG | RESPIRATION RATE: 18 BRPM | TEMPERATURE: 98.8 F | DIASTOLIC BLOOD PRESSURE: 80 MMHG | WEIGHT: 198 LBS

## 2022-08-03 DIAGNOSIS — F41.0 PANIC ATTACKS: ICD-10-CM

## 2022-08-03 DIAGNOSIS — R20.0 BILATERAL ARM NUMBNESS AND TINGLING WHILE SLEEPING: ICD-10-CM

## 2022-08-03 DIAGNOSIS — Z00.00 ENCOUNTER FOR WELL ADULT EXAM WITHOUT ABNORMAL FINDINGS: Primary | ICD-10-CM

## 2022-08-03 DIAGNOSIS — R20.2 BILATERAL ARM NUMBNESS AND TINGLING WHILE SLEEPING: ICD-10-CM

## 2022-08-03 DIAGNOSIS — F41.9 ANXIETY: ICD-10-CM

## 2022-08-03 PROCEDURE — 99396 PREV VISIT EST AGE 40-64: CPT | Performed by: NURSE PRACTITIONER

## 2022-08-03 RX ORDER — ASCORBIC ACID 500 MG
TABLET ORAL
COMMUNITY
Start: 2022-03-13

## 2022-08-03 RX ORDER — BACLOFEN 10 MG/1
10 TABLET ORAL 3 TIMES DAILY
Qty: 30 TABLET | Refills: 1 | Status: SHIPPED | OUTPATIENT
Start: 2022-08-03 | End: 2022-08-13

## 2022-08-03 RX ORDER — LORAZEPAM 2 MG/1
2 TABLET ORAL DAILY PRN
Qty: 30 TABLET | Refills: 2 | Status: SHIPPED | OUTPATIENT
Start: 2022-08-03 | End: 2022-09-02

## 2022-08-03 RX ORDER — BACLOFEN 10 MG/1
TABLET ORAL
COMMUNITY
End: 2022-08-03 | Stop reason: ALTCHOICE

## 2022-08-03 ASSESSMENT — ENCOUNTER SYMPTOMS
DIARRHEA: 0
ALLERGIC/IMMUNOLOGIC NEGATIVE: 1
EYE PAIN: 0
WHEEZING: 0
NAUSEA: 0
EYE DISCHARGE: 0
COUGH: 0
RHINORRHEA: 0
TROUBLE SWALLOWING: 0
SORE THROAT: 0
SHORTNESS OF BREATH: 0
CONSTIPATION: 0
VOMITING: 0
ABDOMINAL PAIN: 0
BACK PAIN: 0
EYE REDNESS: 0

## 2022-08-03 ASSESSMENT — PATIENT HEALTH QUESTIONNAIRE - PHQ9
1. LITTLE INTEREST OR PLEASURE IN DOING THINGS: 0
SUM OF ALL RESPONSES TO PHQ QUESTIONS 1-9: 0
SUM OF ALL RESPONSES TO PHQ QUESTIONS 1-9: 0
2. FEELING DOWN, DEPRESSED OR HOPELESS: 0
SUM OF ALL RESPONSES TO PHQ QUESTIONS 1-9: 0
SUM OF ALL RESPONSES TO PHQ9 QUESTIONS 1 & 2: 0
SUM OF ALL RESPONSES TO PHQ QUESTIONS 1-9: 0

## 2022-08-03 NOTE — PATIENT INSTRUCTIONS
Well Visit, Ages 25 to 48: Care Instructions  Overview     Well visits can help you stay healthy. Your doctor has checked your overall health and may have suggested ways to take good care of yourself. Your doctor also may have recommended tests. At home, you can help prevent illness withhealthy eating, regular exercise, and other steps. Follow-up care is a key part of your treatment and safety. Be sure to make and go to all appointments, and call your doctor if you are having problems. It's also a good idea to know your test results and keep alist of the medicines you take. How can you care for yourself at home? Get screening tests that you and your doctor decide on. Screening helps find diseases before any symptoms appear. Eat healthy foods. Choose fruits, vegetables, whole grains, protein, and low-fat dairy foods. Limit fat, especially saturated fat. Reduce salt in your diet. Limit alcohol. If you are a man, have no more than 2 drinks a day or 14 drinks a week. If you are a woman, have no more than 1 drink a day or 7 drinks a week. Get at least 30 minutes of physical activity on most days of the week. Walking is a good choice. You also may want to do other activities, such as running, swimming, cycling, or playing tennis or team sports. Discuss any changes in your exercise program with your doctor. Reach and stay at a healthy weight. This will lower your risk for many problems, such as obesity, diabetes, heart disease, and high blood pressure. Do not smoke or allow others to smoke around you. If you need help quitting, talk to your doctor about stop-smoking programs and medicines. These can increase your chances of quitting for good. Care for your mental health. It is easy to get weighed down by worry and stress. Learn strategies to manage stress, like deep breathing and mindfulness, and stay connected with your family and community. If you find you often feel sad or hopeless, talk with your doctor. Treatment can help. Talk to your doctor about whether you have any risk factors for sexually transmitted infections (STIs). You can help prevent STIs if you wait to have sex with a new partner (or partners) until you've each been tested for STIs. It also helps if you use condoms (male or female condoms) and if you limit your sex partners to one person who only has sex with you. Vaccines are available for some STIs, such as HPV. Use birth control if it's important to you to prevent pregnancy. Talk with your doctor about the choices available and what might be best for you. If you think you may have a problem with alcohol or drug use, talk to your doctor. This includes prescription medicines (such as amphetamines and opioids) and illegal drugs (such as cocaine and methamphetamine). Your doctor can help you figure out what type of treatment is best for you. Protect your skin from too much sun. When you're outdoors from 10 a.m. to 4 p.m., stay in the shade or cover up with clothing and a hat with a wide brim. Wear sunglasses that block UV rays. Even when it's cloudy, put broad-spectrum sunscreen (SPF 30 or higher) on any exposed skin. See a dentist one or two times a year for checkups and to have your teeth cleaned. Wear a seat belt in the car. When should you call for help? Watch closely for changes in your health, and be sure to contact your doctor if you have any problems or symptoms that concern you. Where can you learn more? Go to https://[facility]que.[facility]. org and sign in to your [employer] account. Enter P072 in the [facility] box to learn more about \"Well Visit, Ages 25 to 48: Care Instructions. \"     If you do not have an account, please click on the \"Sign Up Now\" link. Current as of: October 6, 2021               Content Version: 13.3  © 2006-2022 [employer]. Care instructions adapted under license by [facility] ([facility]).  If you have questions about a medical condition or this instruction, always ask your healthcare professional. [address] [address] any warranty or liability for your use of this information.

## 2022-08-03 NOTE — PROGRESS NOTES
Well Adult Note  Name: Benson Hanley Date: 8/3/2022   MRN: [id number] Sex: Male   Age: 54 y.o. Ethnicity: Non- / Non    : 1970 Race: White (non-)      Myra Wilson is here for well adult exam.  History:  Patient also needs updated Ativan prescription for his anxiety, which is only periodic and a prescription lasts him several months. 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 Patient brings has a concern today bilateral arm numbness and tingling while he sleeps. He often wakes up in the middle of the night because of the pain, will move his arms around and rub his hands to return what seems to be poor circulation in his limbs. This been going on for many years. He does have a history of cervical surgery. Review of Systems   Constitutional:  Negative for activity change, fatigue and fever. HENT:  Negative for congestion, ear pain, rhinorrhea, sore throat and trouble swallowing. Eyes:  Negative for pain, discharge and redness. Respiratory:  Negative for cough, shortness of breath and wheezing. Cardiovascular: Negative. Gastrointestinal:  Negative for abdominal pain, constipation, diarrhea, nausea and vomiting. Endocrine: Negative. Genitourinary:  Negative for dysuria, frequency and urgency. Musculoskeletal:  Negative for arthralgias, back pain and myalgias. Skin:  Negative for rash. Allergic/Immunologic: Negative. Neurological:  Positive for numbness. Negative for dizziness, tremors, weakness and headaches. Hematological: Negative. Psychiatric/Behavioral:  Negative for dysphoric mood and sleep disturbance. The patient is not nervous/anxious. Allergies   Allergen Reactions    Morphine Rash     Other reaction(s): Unknown         Prior to Visit Medications    Medication Sig Taking?  Authorizing Provider   ascorbic acid (VITAMIN C) 500 MG tablet Take by mouth Yes Historical Provider, MD   ELDERBERRY PO Take by mouth Yes Historical Provider, MD   vitamin D 25 MCG (1000 UT) CAPS Take by mouth Yes Historical Provider, MD   LORazepam (ATIVAN) 2 MG tablet Take 1 tablet by mouth daily as needed for Anxiety for up to 30 days. Yes , APRN - CNP   baclofen (LIORESAL) 10 MG tablet Take 1 tablet by mouth in the morning and 1 tablet at noon and 1 tablet before bedtime. Do all this for 10 days. Yes , APRN - CNP   meloxicam (MOBIC) 15 MG tablet Take 1 tablet by mouth daily as needed for Pain Yes , APRN - CNP   aspirin EC 81 MG EC tablet Take 1 tablet by mouth daily.  Yes Diedra Simmonds, MD         Past Medical History:   Diagnosis Date    Asthma 48    COVID     MI, old 2012       Past Surgical History:   Procedure Laterality Date    [address]  2012    Per Pt \"no stent\"     Jayson Yusuf      Dr. Gage William      benign tumor         Family History   Problem Relation Age of Onset    Arthritis Mother     Alcohol Abuse Father     COPD Father 62    Hypertension Father 54    Cancer Father         Unsure    Cancer Maternal Aunt 61        lung     Cancer Paternal Aunt     Parkinsonism Paternal Uncle     Cancer Maternal Grandfather 61    Heart Attack Paternal Grandfather     Heart Disease Paternal Grandfather     Coronary Art Dis Paternal Grandfather     Stroke Paternal Aunt     Cancer Paternal Aunt     Alzheimer's Disease Paternal Aunt     Cancer Paternal Uncle     Other Paternal Uncle         Lung Infection     Diabetes Paternal Uncle        Social History     Tobacco Use    Smoking status: Former     Packs/day: 1.50     Years: 30.00     Pack years: 45.00     Types: Cigarettes     Quit date: 2021     Years since quittin.5    Smokeless tobacco: Never    Tobacco comments:     printed to avs   Vaping Use    Vaping Use: Never used   Substance Use Topics    Alcohol use: No    Drug use: No       Objective   /80   Pulse 80   Temp 98.8 °F (37.1 °C) (Oral)   Resp 18   Ht 5' 7\" (1.702 m)   Wt 198 lb (89.8 kg)   BMI 31.01 kg/m²   Wt Readings from Last 3 Encounters:   08/03/22 198 lb (89.8 kg)   12/09/21 200 lb (90.7 kg)   10/29/21 193 lb 12.8 oz (87.9 kg)     There were no vitals filed for this visit. Physical Exam  Vitals reviewed. Constitutional:       General: He is not in acute distress. Appearance: Normal appearance. He is well-developed. He is not toxic-appearing or diaphoretic. HENT:      Head: Normocephalic. No right periorbital erythema or left periorbital erythema. Jaw: No trismus. Right Ear: Hearing and external ear normal.      Left Ear: Hearing and external ear normal.      Nose: Nose normal. No mucosal edema or rhinorrhea. Mouth/Throat:      Mouth: Mucous membranes are moist.      Dentition: Normal dentition. Pharynx: Uvula midline. No posterior oropharyngeal erythema. Tonsils: No tonsillar exudate. 0 on the right. 0 on the left. Eyes:      General: Lids are normal.         Right eye: No discharge. Left eye: No discharge. Conjunctiva/sclera: Conjunctivae normal.      Right eye: Right conjunctiva is not injected. No chemosis. Left eye: No chemosis. Pupils: Pupils are equal, round, and reactive to light. Neck:      Vascular: No JVD. Trachea: Trachea normal.   Cardiovascular:      Rate and Rhythm: Normal rate and regular rhythm. Heart sounds: Normal heart sounds. No murmur heard. Pulmonary:      Effort: Pulmonary effort is normal. No respiratory distress. Breath sounds: Normal breath sounds. No stridor. No wheezing. Abdominal:      General: Bowel sounds are normal. There is no distension. Palpations: Abdomen is soft. Tenderness: There is no abdominal tenderness. Musculoskeletal:         General: No tenderness or signs of injury. Normal range of motion.       Cervical back: Full passive range of motion without pain and normal range of motion. Right lower leg: No edema. Left lower leg: No edema. Lymphadenopathy:      Cervical: No cervical adenopathy. Skin:     General: Skin is warm and dry. Capillary Refill: Capillary refill takes less than 2 seconds. Findings: No rash. Neurological:      Mental Status: He is alert and oriented to person, place, and time. GCS: GCS eye subscore is 4. GCS verbal subscore is 5. GCS motor subscore is 6. Cranial Nerves: No cranial nerve deficit. Motor: Motor function is intact. No weakness. Coordination: Coordination is intact. Coordination normal.      Gait: Gait normal.   Psychiatric:         Mood and Affect: Mood is not anxious or depressed. Speech: Speech normal.         Behavior: Behavior normal. Behavior is not withdrawn or hyperactive. Behavior is cooperative. Thought Content: Thought content normal.         Judgment: Judgment normal.         Assessment   Plan   1. Encounter for well adult exam without abnormal findings  -     Basic Metabolic Panel; Future  -     Lipid, Fasting; Future  -     Hepatic Function Panel; Future  -     CBC with Auto Differential; Future  2. Anxiety  -     LORazepam (ATIVAN) 2 MG tablet; Take 1 tablet by mouth daily as needed for Anxiety for up to 30 days. , Disp-30 tablet, R-2Normal  3. Panic attacks  -     LORazepam (ATIVAN) 2 MG tablet; Take 1 tablet by mouth daily as needed for Anxiety for up to 30 days. , Disp-30 tablet, R-2Normal  4. Bilateral arm numbness and tingling while sleeping  -     VASC DEANGELO;  Future         Personalized Preventive Plan   Current Health Maintenance Status  Immunization History   Administered Date(s) Administered    COVID-19, MODERNA BLUE border, Primary or Immunocompromised, (age 12y+), IM, 100 mcg/0.5mL 03/12/2021, 04/12/2021    Influenza, MDCK Quadv, IM, PF (Flucelvax 2 yrs and older) 10/29/2021    Influenza, Quadv, IM, (6 mo and older Fluzone, Flulaval, Fluarix and 3 yrs and older Afluria) 11/21/2019        Health Maintenance   Topic Date Due    Colorectal Cancer Screen  Never done    COVID-19 Vaccine (3 - Booster for Moderna series) 09/12/2021    Depression Screen  02/22/2022    A1C test (Diabetic or Prediabetic)  03/20/2022    DTaP/Tdap/Td vaccine (1 - Tdap) 08/03/2023 (Originally 9/9/1991)    Flu vaccine (1) 09/01/2022    Lipids  03/20/2026    HIV screen  Completed    Hepatitis A vaccine  Aged Out    Hepatitis B vaccine  Aged Out    Hib vaccine  Aged Out    Meningococcal (ACWY) vaccine  Aged Out    Pneumococcal 0-64 years Vaccine  Aged Out    Hepatitis C screen  Discontinued     Recommendations for Preventive Services Due: see orders and patient instructions/AVS.    Return in about 3 months (around 11/3/2022).

## 2022-08-17 ENCOUNTER — HOSPITAL ENCOUNTER (OUTPATIENT)
Dept: INTERVENTIONAL RADIOLOGY/VASCULAR | Age: 52
Discharge: HOME | End: 2022-08-17

## 2022-08-17 DIAGNOSIS — R20.2 BILATERAL ARM NUMBNESS AND TINGLING WHILE SLEEPING: ICD-10-CM

## 2022-08-17 DIAGNOSIS — R20.0 BILATERAL ARM NUMBNESS AND TINGLING WHILE SLEEPING: ICD-10-CM

## 2022-08-18 ENCOUNTER — TELEPHONE (OUTPATIENT)
Dept: FAMILY MEDICINE CLINIC | Age: 52
End: 2022-08-18

## 2022-08-18 DIAGNOSIS — F17.200 HAS BEEN SMOKING FOR 30 YEARS: ICD-10-CM

## 2022-08-18 DIAGNOSIS — R20.0 BILATERAL ARM NUMBNESS AND TINGLING WHILE SLEEPING: Primary | ICD-10-CM

## 2022-08-18 DIAGNOSIS — R20.2 BILATERAL ARM NUMBNESS AND TINGLING WHILE SLEEPING: Primary | ICD-10-CM

## 2022-08-18 NOTE — TELEPHONE ENCOUNTER
Received a call from [facility], the test that was originally ordered for him on 8-3-22 to check his arms was not correct so they sent him home as it was after hours and could not reach our office to get the order corrected. The  states it should be \"Vascular Duplex Upper Extremity Arterial Bilateral\". I spoke with Zaki Peres and he is ok with changing the order. Order is changed and central scheduling will contact the patient.

## 2022-08-30 ENCOUNTER — HOSPITAL ENCOUNTER (OUTPATIENT)
Dept: INTERVENTIONAL RADIOLOGY/VASCULAR | Age: 52
Discharge: HOME | End: 2022-08-30
Payer: COMMERCIAL

## 2022-08-30 ENCOUNTER — APPOINTMENT (OUTPATIENT)
Dept: INTERVENTIONAL RADIOLOGY/VASCULAR | Age: 52
End: 2022-08-30
Payer: COMMERCIAL

## 2022-08-30 DIAGNOSIS — R20.0 BILATERAL ARM NUMBNESS AND TINGLING WHILE SLEEPING: ICD-10-CM

## 2022-08-30 DIAGNOSIS — R20.2 BILATERAL ARM NUMBNESS AND TINGLING WHILE SLEEPING: ICD-10-CM

## 2022-08-30 DIAGNOSIS — F17.200 HAS BEEN SMOKING FOR 30 YEARS: ICD-10-CM

## 2022-08-30 PROCEDURE — 93930 UPPER EXTREMITY STUDY: CPT

## 2022-09-15 ENCOUNTER — HOSPITAL ENCOUNTER (EMERGENCY)
Age: 52
Discharge: HOME | End: 2022-09-15
Attending: EMERGENCY MEDICINE
Payer: COMMERCIAL

## 2022-09-15 VITALS
HEIGHT: 67 IN | TEMPERATURE: 98.2 F | RESPIRATION RATE: 17 BRPM | OXYGEN SATURATION: 99 % | HEART RATE: 88 BPM | SYSTOLIC BLOOD PRESSURE: 145 MMHG | WEIGHT: 202.8 LBS | DIASTOLIC BLOOD PRESSURE: 85 MMHG | BODY MASS INDEX: 31.83 KG/M2

## 2022-09-15 DIAGNOSIS — H20.9 ANTERIOR UVEITIS: Primary | ICD-10-CM

## 2022-09-15 PROCEDURE — 99215 OFFICE O/P EST HI 40 MIN: CPT

## 2022-09-15 PROCEDURE — G0463 HOSPITAL OUTPT CLINIC VISIT: HCPCS

## 2022-09-15 PROCEDURE — 2500000003 HC RX 250 WO HCPCS: Performed by: STUDENT IN AN ORGANIZED HEALTH CARE EDUCATION/TRAINING PROGRAM

## 2022-09-15 PROCEDURE — 99283 EMERGENCY DEPT VISIT LOW MDM: CPT

## 2022-09-15 RX ORDER — CIPROFLOXACIN HYDROCHLORIDE 3.5 MG/ML
2 SOLUTION/ DROPS TOPICAL SEE ADMIN INSTRUCTIONS
Qty: 1 EACH | Refills: 0 | Status: SHIPPED | OUTPATIENT
Start: 2022-09-15 | End: 2022-09-15

## 2022-09-15 RX ORDER — CIPROFLOXACIN HYDROCHLORIDE 3.5 MG/ML
2 SOLUTION/ DROPS TOPICAL SEE ADMIN INSTRUCTIONS
Qty: 1 EACH | Refills: 0 | Status: SHIPPED | OUTPATIENT
Start: 2022-09-15 | End: 2022-09-29

## 2022-09-15 RX ORDER — PROPARACAINE HYDROCHLORIDE 5 MG/ML
1 SOLUTION/ DROPS OPHTHALMIC ONCE
Status: COMPLETED | OUTPATIENT
Start: 2022-09-15 | End: 2022-09-15

## 2022-09-15 RX ORDER — PREDNISOLONE ACETATE 10 MG/ML
1 SUSPENSION/ DROPS OPHTHALMIC 4 TIMES DAILY
Qty: 1 EACH | Refills: 0 | Status: SHIPPED | OUTPATIENT
Start: 2022-09-15 | End: 2022-09-18

## 2022-09-15 RX ORDER — LORAZEPAM 2 MG/1
2 TABLET ORAL EVERY 6 HOURS PRN
COMMUNITY

## 2022-09-15 RX ADMIN — PROPARACAINE HYDROCHLORIDE 1 DROP: 5 SOLUTION/ DROPS OPHTHALMIC at 20:13

## 2022-09-15 ASSESSMENT — ENCOUNTER SYMPTOMS
SINUS PAIN: 0
NAUSEA: 0
VOICE CHANGE: 0
EYE REDNESS: 1
TROUBLE SWALLOWING: 0
ROS SKIN COMMENTS: NO RASH OR BRUISING
SORE THROAT: 0
BACK PAIN: 0
EYE DISCHARGE: 0
EYE PAIN: 0
STRIDOR: 0
DIARRHEA: 0
PHOTOPHOBIA: 1
VOMITING: 0
WHEEZING: 0
CONSTIPATION: 0
EYE REDNESS: 0
SINUS PRESSURE: 0
COUGH: 0
SHORTNESS OF BREATH: 0
EYE PAIN: 1
ABDOMINAL PAIN: 0

## 2022-09-15 ASSESSMENT — VISUAL ACUITY
OU: 20/20
OS: 20/20
OD: 20/30

## 2022-09-15 ASSESSMENT — PAIN - FUNCTIONAL ASSESSMENT: PAIN_FUNCTIONAL_ASSESSMENT: 0-10

## 2022-09-15 ASSESSMENT — PAIN DESCRIPTION - DESCRIPTORS: DESCRIPTORS: BURNING

## 2022-09-15 ASSESSMENT — PAIN DESCRIPTION - ORIENTATION: ORIENTATION: RIGHT

## 2022-09-15 ASSESSMENT — PAIN SCALES - GENERAL: PAINLEVEL_OUTOF10: 9

## 2022-09-15 ASSESSMENT — PAIN DESCRIPTION - LOCATION: LOCATION: EYE

## 2022-09-15 NOTE — ED NOTES
Dr Nelli Jimenez calls report to [facility] regarding transfer     Stefano Hicks, NAS  09/15/22 [address], RN  09/15/22 9822

## 2022-09-15 NOTE — DISCHARGE INSTRUCTIONS
Use the topical antibiotic Ciprofloxacin drops in your right eye:  On day1:  Use 2 drops every 15 minutes for 6 hours then 2 drops every 30 minutes for the rest of the day.     On the second day: 2 drops/hour  On days 3 through 14: use 2 drops every 4 hours    Use the Steroid (prednisolone) eye drop:  1 drop 4 times a day in the right eye for 3 days    You can use Tylenol or ibuprofen for pain  See your eye doctor within the next 24 hours  Return to the ED if your symptoms worsen or you feel you need to be reevaluated  Follow-up with your family doctor within a week

## 2022-09-15 NOTE — ED NOTES
[employer] is not listed on required drug  screen list     Claudell Gent, RN  09/15/22 3921

## 2022-09-15 NOTE — ED PROVIDER NOTES
reviewed  PAST MEDICAL HISTORY         Diagnosis Date    Asthma 1984    COVID     MI, old 04/30/2012       SURGICAL HISTORY     Patient  has a past surgical history that includes Cardiac catheterization (4/2012); Inner ear surgery (2008); Cervical spine surgery (2009); and Appendectomy (1985). CURRENT MEDICATIONS       Previous Medications    ASCORBIC ACID (VITAMIN C) 500 MG TABLET    Take by mouth    ASPIRIN EC 81 MG EC TABLET    Take 1 tablet by mouth daily. BACLOFEN 10 MG PACK    Take by mouth    ELDERBERRY PO    Take by mouth    LORAZEPAM (ATIVAN) 2 MG TABLET    Take 2 mg by mouth every 6 hours as needed for Anxiety. MELOXICAM (MOBIC) 15 MG TABLET    Take 1 tablet by mouth daily as needed for Pain    VITAMIN D 25 MCG (1000 UT) CAPS    Take by mouth       ALLERGIES     Patient is is allergic to morphine. FAMILY HISTORY     Patient'sfamily history includes Alcohol Abuse in his father; Alzheimer's Disease in his paternal aunt; Arthritis in his mother; COPD (age of onset: 62) in his father; Cancer in his father, paternal aunt, paternal aunt, and paternal uncle; Cancer (age of onset: 61) in his maternal aunt and maternal grandfather; Coronary Art Dis in his paternal grandfather; Diabetes in his paternal uncle; Heart Attack in his paternal grandfather; Heart Disease in his paternal grandfather; Hypertension (age of onset: 54) in his father; Other in his paternal uncle; Parkinsonism in his paternal uncle; Stroke in his paternal aunt. SOCIAL HISTORY     Patient  reports that he quit smoking about 19 months ago. He has a 45.00 pack-year smoking history. He has never used smokeless tobacco. He reports that he does not drink alcohol and does not use drugs. PHYSICAL EXAM     ED TRIAGE VITALS  BP: (!) 176/90, Temp: 98.2 °F (36.8 °C), Heart Rate: 81, Resp: 16, SpO2: 98 %  Physical Exam  Vitals and nursing note reviewed. Constitutional:       General: He is not in acute distress.      Appearance: He is well-developed. He is not ill-appearing. Comments: Moist membranes   HENT:      Head: Normocephalic and atraumatic. Right Ear: External ear normal.      Left Ear: External ear normal.      Nose: Nose normal.      Mouth/Throat:      Pharynx: No oropharyngeal exudate. Comments: Pharynx normal  Eyes:      General: No scleral icterus. Right eye: No discharge. Left eye: No discharge. Extraocular Movements:      Right eye: Normal extraocular motion. Left eye: Normal extraocular motion. Conjunctiva/sclera:      Right eye: Right conjunctiva is injected. Left eye: Left conjunctiva is not injected. Pupils: Pupils are equal, round, and reactive to light. Comments: Right eye redness with photophobia. No obvious foreign body. No purulent discharge   Neck:      Thyroid: No thyromegaly. Vascular: No JVD. Comments: No Meningismus  Cardiovascular:      Rate and Rhythm: Normal rate and regular rhythm. Pulses: Normal pulses. Heart sounds: Normal heart sounds, S1 normal and S2 normal. No murmur heard. No friction rub. No gallop. Pulmonary:      Effort: Pulmonary effort is normal. No tachypnea or respiratory distress. Breath sounds: Normal breath sounds. No stridor. No decreased breath sounds, wheezing, rhonchi or rales. Comments: No cough lungs clear  Chest:      Chest wall: No tenderness. Abdominal:      General: Bowel sounds are normal. There is no distension. Palpations: Abdomen is soft. There is no mass. Tenderness: There is no abdominal tenderness. There is no guarding or rebound. Musculoskeletal:         General: No tenderness. Normal range of motion. Cervical back: Normal range of motion. Comments: Extremities normal   Lymphadenopathy:      Cervical: No cervical adenopathy. Right cervical: No superficial cervical adenopathy. Left cervical: No superficial cervical adenopathy.    Skin:     General: Skin is warm and dry. Findings: No erythema or rash. Comments: No rash or bruising   Neurological:      Mental Status: He is alert and oriented to person, place, and time. Cranial Nerves: No cranial nerve deficit. Motor: No abnormal muscle tone. Coordination: Coordination normal.      Deep Tendon Reflexes: Reflexes are normal and symmetric. Reflexes normal.      Comments: Appropriate no focal finding. Decreased visual acuity right eye   Psychiatric:         Behavior: Behavior normal.         Thought Content: Thought content normal.         Judgment: Judgment normal.       DIAGNOSTIC RESULTS   Labs: No results found for this visit on 09/15/22. IMAGING:  No orders to display     URGENT CARE COURSE:     Vitals:    09/15/22 1820   BP: (!) 176/90   Pulse: 81   Resp: 16   Temp: 98.2 °F (36.8 °C)   SpO2: 98%   Weight: 202 lb 12.8 oz (92 kg)   Height: 5' 7\" (1.702 m)       Medications - No data to display  PROCEDURES:  None  FINALIMPRESSION      1. Right eye injury, initial encounter    2. Elevated blood pressure reading        DISPOSITION/PLAN   DISPOSITION Decision To Transfer 09/15/2022 06:56:03 PM  Patient transferred to [facility] ED per patient's request.  Patient stable for private vehicle transfer. Patient accepted in transfer by [facility] ED charge nurse at 1567.    PATIENT REFERRED TO:  to [facility] ED      to [facility] ED    DISCHARGE MEDICATIONS:  New Prescriptions    No medications on file     Current Discharge Medication List          MD Adenike Bahena MD  09/15/22 6855

## 2022-09-15 NOTE — ED NOTES
TO room 2 c/o right eye redness and irritaion after cleaning out wearhouse out at work.   \" I think I got a piece of vinyl in it\" [employer]     Lyndsay Crimes, RN  09/15/22 [address], RN  09/15/22 3815

## 2022-09-16 NOTE — ED NOTES
Patient resting in bed. Respirations easy and unlabored. No distress noted. Call light within reach.   Dr Barbara Casey at bedside for [address] [place] [address]  09/15/22 2033

## 2022-11-03 ENCOUNTER — OFFICE VISIT (OUTPATIENT)
Dept: FAMILY MEDICINE CLINIC | Age: 52
End: 2022-11-03
Payer: COMMERCIAL

## 2022-11-03 VITALS
SYSTOLIC BLOOD PRESSURE: 138 MMHG | HEART RATE: 84 BPM | DIASTOLIC BLOOD PRESSURE: 84 MMHG | RESPIRATION RATE: 16 BRPM | BODY MASS INDEX: 31.07 KG/M2 | WEIGHT: 205 LBS | HEIGHT: 68 IN

## 2022-11-03 DIAGNOSIS — F41.0 PANIC ATTACKS: ICD-10-CM

## 2022-11-03 DIAGNOSIS — F41.9 ANXIETY: Primary | ICD-10-CM

## 2022-11-03 DIAGNOSIS — R05.3 CHRONIC COUGH: ICD-10-CM

## 2022-11-03 DIAGNOSIS — R20.0 BILATERAL HAND NUMBNESS: ICD-10-CM

## 2022-11-03 DIAGNOSIS — Z12.11 COLON CANCER SCREENING: ICD-10-CM

## 2022-11-03 DIAGNOSIS — Z87.891 STOPPED SMOKING WITH GREATER THAN 30 PACK YEAR HISTORY: ICD-10-CM

## 2022-11-03 PROCEDURE — 99214 OFFICE O/P EST MOD 30 MIN: CPT | Performed by: NURSE PRACTITIONER

## 2022-11-03 RX ORDER — PREDNISONE 20 MG/1
20 TABLET ORAL 2 TIMES DAILY
Qty: 10 TABLET | Refills: 0 | Status: SHIPPED | OUTPATIENT
Start: 2022-11-03 | End: 2022-11-08

## 2022-11-03 SDOH — ECONOMIC STABILITY: FOOD INSECURITY: WITHIN THE PAST 12 MONTHS, THE FOOD YOU BOUGHT JUST DIDN'T LAST AND YOU DIDN'T HAVE MONEY TO GET MORE.: NEVER TRUE

## 2022-11-03 SDOH — ECONOMIC STABILITY: FOOD INSECURITY: WITHIN THE PAST 12 MONTHS, YOU WORRIED THAT YOUR FOOD WOULD RUN OUT BEFORE YOU GOT MONEY TO BUY MORE.: NEVER TRUE

## 2022-11-03 ASSESSMENT — ENCOUNTER SYMPTOMS
WHEEZING: 0
SORE THROAT: 0
ALLERGIC/IMMUNOLOGIC NEGATIVE: 1
ABDOMINAL PAIN: 0
CONSTIPATION: 0
EYE DISCHARGE: 0
TROUBLE SWALLOWING: 0
VOMITING: 0
EYE REDNESS: 0
BACK PAIN: 0
EYE PAIN: 0
SHORTNESS OF BREATH: 0
NAUSEA: 0
COUGH: 0
DIARRHEA: 0
RHINORRHEA: 0

## 2022-11-03 ASSESSMENT — SOCIAL DETERMINANTS OF HEALTH (SDOH): HOW HARD IS IT FOR YOU TO PAY FOR THE VERY BASICS LIKE FOOD, HOUSING, MEDICAL CARE, AND HEATING?: NOT HARD AT ALL

## 2022-11-03 NOTE — PROGRESS NOTES
[address] [address] [address]  [facility] [place] [address]  Dept: [phone]  Dept Fax: [phone]  Loc: [phone]     Visit Date:  11/3/2022      Patient:  Grace Gomez  YOB: 1970    HPI:     Chief Complaint   Patient presents with    3 Month Follow-Up       Patient here for 3-month follow-up on his treatment for anxiety and panic attacks. He states he is doing very well, does not take the medication every day, no recent panic attacks situations. Patient also has been experiencing coughing for couple of weeks with no fever or indication of infection. Quit smoking 2 years ago but is occasionally having coughing spells that are somewhat productive. Patient due for colonoscopy but has too much anxiety to submit to the procedure. Patient has had about 10 years of intermittent bilateral hand pain and numbness with the left being worse than the right. Patient is a  and has significant repetitive motion activities and gripping. Patient attempted EMG last year but it was too painful and he quit early. Recently completed ultrasound of both arms and the results are normal.      Medications    Current Outpatient Medications:     predniSONE (DELTASONE) 20 MG tablet, Take 1 tablet by mouth 2 times daily for 5 days, Disp: 10 tablet, Rfl: 0    LORazepam (ATIVAN) 2 MG tablet, Take 2 mg by mouth every 6 hours as needed for Anxiety. , Disp: , Rfl:     Baclofen 10 MG PACK, Take by mouth, Disp: , Rfl:     ascorbic acid (VITAMIN C) 500 MG tablet, Take by mouth, Disp: , Rfl:     ELDERBERRY PO, Take by mouth, Disp: , Rfl:     vitamin D 25 MCG (1000 UT) CAPS, Take by mouth, Disp: , Rfl:     meloxicam (MOBIC) 15 MG tablet, Take 1 tablet by mouth daily as needed for Pain, Disp: 30 tablet, Rfl: 2    aspirin EC 81 MG EC tablet, Take 1 tablet by mouth daily. , Disp: 30 tablet, Rfl: 3    The patient is allergic to morphine.     Past Medical History  Mandi Laws  has a past medical history of MI, old. Subjective:      Review of Systems   Constitutional:  Negative for activity change, fatigue and fever. HENT:  Negative for congestion, ear pain, rhinorrhea, sore throat and trouble swallowing. Eyes:  Negative for pain, discharge and redness. Respiratory:  Negative for cough, shortness of breath and wheezing. Cardiovascular: Negative. Gastrointestinal:  Negative for abdominal pain, constipation, diarrhea, nausea and vomiting. Endocrine: Negative. Genitourinary:  Negative for dysuria, frequency and urgency. Musculoskeletal:  Negative for arthralgias, back pain and myalgias. Skin:  Negative for rash. Allergic/Immunologic: Negative. Neurological:  Negative for dizziness, tremors, weakness and headaches. Hematological: Negative. Psychiatric/Behavioral:  Negative for dysphoric mood and sleep disturbance. The patient is not nervous/anxious. Objective:     /84   Pulse 84   Resp 16   Ht 5' 7.5\" (1.715 m)   Wt 205 lb (93 kg)   BMI 31.63 kg/m²     Physical Exam  Constitutional:       General: He is not in acute distress. Appearance: He is well-developed. He is not diaphoretic. HENT:      Right Ear: External ear normal.      Left Ear: External ear normal.      Nose: Nose normal.      Mouth/Throat:      Mouth: Mucous membranes are moist.   Eyes:      General:         Right eye: No discharge. Left eye: No discharge. Conjunctiva/sclera: Conjunctivae normal.      Pupils: Pupils are equal, round, and reactive to light. Neck:      Vascular: No JVD. Cardiovascular:      Rate and Rhythm: Normal rate and regular rhythm. Heart sounds: Normal heart sounds. No murmur heard. Pulmonary:      Effort: Pulmonary effort is normal. No respiratory distress. Breath sounds: Normal breath sounds. Musculoskeletal:         General: No tenderness or deformity. Normal range of motion.       Cervical back: Normal range of motion. Skin:     General: Skin is warm and dry. Capillary Refill: Capillary refill takes less than 2 seconds. Coloration: Skin is not pale. Findings: No erythema or rash. Neurological:      Mental Status: He is alert and oriented to person, place, and time. Coordination: Coordination normal.   Psychiatric:         Behavior: Behavior normal.         Thought Content: Thought content normal.         Judgment: Judgment normal.       Assessment/Plan:      Richie Aaron was seen today for 3 month follow-up. Diagnoses and all orders for this visit:    Anxiety    Panic attacks    Bilateral hand numbness  -     XR CERVICAL SPINE (2-3 VIEWS); Future    Colon cancer screening  -     Fecal DNA Colorectal cancer screening (Cologuard)    Stopped smoking with greater than 30 pack year history  -     predniSONE (DELTASONE) 20 MG tablet; Take 1 tablet by mouth 2 times daily for 5 days    Chronic cough  -     predniSONE (DELTASONE) 20 MG tablet; Take 1 tablet by mouth 2 times daily for 5 days    Patient may wait to start his imaging until after January 1 for insurance reasons. Return in about 3 months (around 2/3/2023). Patient instructions given andreviewed.         Electronically signed by ROSAURA Bradley CNP on 11/3/2022 at 8:32 AM

## 2022-11-19 LAB — NONINV COLON CA DNA+OCC BLD SCRN STL QL: POSITIVE

## 2022-11-21 ENCOUNTER — TELEPHONE (OUTPATIENT)
Dept: FAMILY MEDICINE CLINIC | Age: 52
End: 2022-11-21

## 2022-11-21 DIAGNOSIS — R19.5 POSITIVE COLORECTAL CANCER SCREENING USING COLOGUARD TEST: Primary | ICD-10-CM

## 2022-11-21 NOTE — TELEPHONE ENCOUNTER
----- Message from ROSAURA Srinivasan CNP sent at 11/20/2022  3:17 PM EST -----  Cologuard is positive. Patient should be scheduled for colonoscopy.

## 2023-05-01 ENCOUNTER — OFFICE VISIT (OUTPATIENT)
Dept: FAMILY MEDICINE CLINIC | Age: 53
End: 2023-05-01

## 2023-05-01 ENCOUNTER — TELEPHONE (OUTPATIENT)
Dept: FAMILY MEDICINE CLINIC | Age: 53
End: 2023-05-01

## 2023-05-01 ENCOUNTER — HOSPITAL ENCOUNTER (OUTPATIENT)
Dept: GENERAL RADIOLOGY | Age: 53
Discharge: HOME | End: 2023-05-01

## 2023-05-01 ENCOUNTER — HOSPITAL ENCOUNTER (OUTPATIENT)
Age: 53
Discharge: HOME | End: 2023-05-01

## 2023-05-01 VITALS
TEMPERATURE: 97.9 F | RESPIRATION RATE: 16 BRPM | WEIGHT: 215 LBS | DIASTOLIC BLOOD PRESSURE: 80 MMHG | HEART RATE: 72 BPM | SYSTOLIC BLOOD PRESSURE: 130 MMHG | BODY MASS INDEX: 33.18 KG/M2

## 2023-05-01 DIAGNOSIS — M79.672 LEFT FOOT PAIN: ICD-10-CM

## 2023-05-01 DIAGNOSIS — H10.9 BACTERIAL CONJUNCTIVITIS OF RIGHT EYE: Primary | ICD-10-CM

## 2023-05-01 DIAGNOSIS — F41.9 ANXIETY: ICD-10-CM

## 2023-05-01 DIAGNOSIS — M43.6 ACUTE TORTICOLLIS: ICD-10-CM

## 2023-05-01 DIAGNOSIS — R20.2 BILATERAL ARM NUMBNESS AND TINGLING WHILE SLEEPING: ICD-10-CM

## 2023-05-01 DIAGNOSIS — H02.9 LESION OF RIGHT UPPER EYELID: ICD-10-CM

## 2023-05-01 DIAGNOSIS — R20.0 BILATERAL ARM NUMBNESS AND TINGLING WHILE SLEEPING: ICD-10-CM

## 2023-05-01 PROCEDURE — 73630 X-RAY EXAM OF FOOT: CPT

## 2023-05-01 PROCEDURE — 99214 OFFICE O/P EST MOD 30 MIN: CPT | Performed by: NURSE PRACTITIONER

## 2023-05-01 RX ORDER — LORAZEPAM 2 MG/1
2 TABLET ORAL 2 TIMES DAILY PRN
Qty: 60 TABLET | Refills: 0 | Status: SHIPPED | OUTPATIENT
Start: 2023-05-01 | End: 2023-05-31

## 2023-05-01 RX ORDER — PREDNISONE 20 MG/1
20 TABLET ORAL 2 TIMES DAILY
Qty: 10 TABLET | Refills: 0 | Status: SHIPPED | OUTPATIENT
Start: 2023-05-01 | End: 2023-05-06

## 2023-05-01 RX ORDER — GENTAMICIN SULFATE 3 MG/ML
1 SOLUTION/ DROPS OPHTHALMIC 3 TIMES DAILY
Qty: 1 EACH | Refills: 0 | Status: SHIPPED | OUTPATIENT
Start: 2023-05-01 | End: 2023-05-06

## 2023-05-01 RX ORDER — MELOXICAM 15 MG/1
15 TABLET ORAL DAILY PRN
Qty: 90 TABLET | Refills: 1 | Status: SHIPPED | OUTPATIENT
Start: 2023-05-01 | End: 2023-07-30

## 2023-05-01 RX ORDER — LORAZEPAM 2 MG/1
2 TABLET ORAL 2 TIMES DAILY PRN
Qty: 30 TABLET | Refills: 0 | Status: SHIPPED | OUTPATIENT
Start: 2023-05-01 | End: 2023-05-01 | Stop reason: SDUPTHER

## 2023-05-01 SDOH — ECONOMIC STABILITY: HOUSING INSECURITY
IN THE LAST 12 MONTHS, WAS THERE A TIME WHEN YOU DID NOT HAVE A STEADY PLACE TO SLEEP OR SLEPT IN A SHELTER (INCLUDING NOW)?: NO

## 2023-05-01 SDOH — ECONOMIC STABILITY: INCOME INSECURITY: HOW HARD IS IT FOR YOU TO PAY FOR THE VERY BASICS LIKE FOOD, HOUSING, MEDICAL CARE, AND HEATING?: NOT HARD AT ALL

## 2023-05-01 SDOH — ECONOMIC STABILITY: FOOD INSECURITY: WITHIN THE PAST 12 MONTHS, YOU WORRIED THAT YOUR FOOD WOULD RUN OUT BEFORE YOU GOT MONEY TO BUY MORE.: NEVER TRUE

## 2023-05-01 SDOH — ECONOMIC STABILITY: FOOD INSECURITY: WITHIN THE PAST 12 MONTHS, THE FOOD YOU BOUGHT JUST DIDN'T LAST AND YOU DIDN'T HAVE MONEY TO GET MORE.: NEVER TRUE

## 2023-05-01 ASSESSMENT — ENCOUNTER SYMPTOMS
EYE ITCHING: 1
SHORTNESS OF BREATH: 0
EYE REDNESS: 1
WHEEZING: 0
VOMITING: 0
SORE THROAT: 0
ALLERGIC/IMMUNOLOGIC NEGATIVE: 1
NAUSEA: 0
RHINORRHEA: 0
ABDOMINAL PAIN: 0
DIARRHEA: 0
COUGH: 0
EYE DISCHARGE: 1
TROUBLE SWALLOWING: 0
CONSTIPATION: 0
EYE PAIN: 1
BACK PAIN: 0

## 2023-05-01 ASSESSMENT — PATIENT HEALTH QUESTIONNAIRE - PHQ9
SUM OF ALL RESPONSES TO PHQ QUESTIONS 1-9: 3
SUM OF ALL RESPONSES TO PHQ9 QUESTIONS 1 & 2: 2
SUM OF ALL RESPONSES TO PHQ QUESTIONS 1-9: 3
5. POOR APPETITE OR OVEREATING: 0
SUM OF ALL RESPONSES TO PHQ QUESTIONS 1-9: 3
7. TROUBLE CONCENTRATING ON THINGS, SUCH AS READING THE NEWSPAPER OR WATCHING TELEVISION: 0
6. FEELING BAD ABOUT YOURSELF - OR THAT YOU ARE A FAILURE OR HAVE LET YOURSELF OR YOUR FAMILY DOWN: 0
2. FEELING DOWN, DEPRESSED OR HOPELESS: 0
SUM OF ALL RESPONSES TO PHQ QUESTIONS 1-9: 3
1. LITTLE INTEREST OR PLEASURE IN DOING THINGS: 2
4. FEELING TIRED OR HAVING LITTLE ENERGY: 1
8. MOVING OR SPEAKING SO SLOWLY THAT OTHER PEOPLE COULD HAVE NOTICED. OR THE OPPOSITE, BEING SO FIGETY OR RESTLESS THAT YOU HAVE BEEN MOVING AROUND A LOT MORE THAN USUAL: 0
9. THOUGHTS THAT YOU WOULD BE BETTER OFF DEAD, OR OF HURTING YOURSELF: 0
10. IF YOU CHECKED OFF ANY PROBLEMS, HOW DIFFICULT HAVE THESE PROBLEMS MADE IT FOR YOU TO DO YOUR WORK, TAKE CARE OF THINGS AT HOME, OR GET ALONG WITH OTHER PEOPLE: 0
3. TROUBLE FALLING OR STAYING ASLEEP: 0

## 2023-05-01 NOTE — PROGRESS NOTES
anxious or depressed. Speech: Speech normal.         Behavior: Behavior normal. Behavior is not withdrawn or hyperactive. Behavior is cooperative. Thought Content: Thought content normal.         Judgment: Judgment normal.       Assessment/Plan:      Keeley Hood was seen today for eye problem and foot pain. Diagnoses and all orders for this visit:    Bacterial conjunctivitis of right eye  -     gentamicin (GARAMYCIN) 0.3 % ophthalmic solution; Place 1 drop into the right eye 3 times daily for 5 days    Left foot pain  -     predniSONE (DELTASONE) 20 MG tablet; Take 1 tablet by mouth 2 times daily for 5 days  -     XR FOOT LEFT (MIN 3 VIEWS); Future    Lesion of right upper eyelid  -     AFL - Derik Booth MD, Dermatology, [facility]    Anxiety  -     LORazepam (ATIVAN) 2 MG tablet; Take 1 tablet by mouth 2 times daily as needed for Anxiety for up to 30 days. Max Daily Amount: 4 mg    Acute torticollis  -     meloxicam (MOBIC) 15 MG tablet; Take 1 tablet by mouth daily as needed for Pain    Bilateral arm numbness and tingling while sleeping  -     [facility] (EMG) - Ashvin Monique MD        Return if symptoms worsen or fail to improve. Patient instructions given andreviewed.         Electronically signed by Fredrick Babinski, APRN - CNP on 5/1/2023 at 12:26 PM

## 2023-05-01 NOTE — TELEPHONE ENCOUNTER
Pt called to state he went to  RX and the rx for Ativan needs revised and resent. RX is for 2x daily, written for 30 tablets. Will need new rx for 60 tablets sent to [facility].

## 2023-05-18 ENCOUNTER — PROCEDURE VISIT (OUTPATIENT)
Dept: NEUROLOGY | Age: 53
End: 2023-05-18

## 2023-05-18 ENCOUNTER — TELEPHONE (OUTPATIENT)
Dept: FAMILY MEDICINE CLINIC | Age: 53
End: 2023-05-18

## 2023-05-18 DIAGNOSIS — M79.601 BILATERAL ARM PAIN: Primary | ICD-10-CM

## 2023-05-18 DIAGNOSIS — M79.602 BILATERAL ARM PAIN: Primary | ICD-10-CM

## 2023-05-18 DIAGNOSIS — R20.2 BILATERAL ARM NUMBNESS AND TINGLING WHILE SLEEPING: Primary | ICD-10-CM

## 2023-05-18 DIAGNOSIS — G56.03 BILATERAL CARPAL TUNNEL SYNDROME: ICD-10-CM

## 2023-05-18 DIAGNOSIS — R20.0 BILATERAL HAND NUMBNESS: ICD-10-CM

## 2023-05-18 DIAGNOSIS — M54.12 CERVICAL RADICULOPATHY: ICD-10-CM

## 2023-05-18 DIAGNOSIS — R20.0 BILATERAL ARM NUMBNESS AND TINGLING WHILE SLEEPING: Primary | ICD-10-CM

## 2023-05-18 PROCEDURE — 95911 NRV CNDJ TEST 9-10 STUDIES: CPT | Performed by: PSYCHIATRY & NEUROLOGY

## 2023-05-18 PROCEDURE — 95886 MUSC TEST DONE W/N TEST COMP: CPT | Performed by: PSYCHIATRY & NEUROLOGY

## 2023-05-18 NOTE — TELEPHONE ENCOUNTER
Pt states he was seen 5/1, xray of left foot showed no fx. He c/o burning on side of left foot. He is requesting an A1c. New vision. CPB     Also request dup upper ext from 8/2022 to be faxed to Dr. Curtis Dennison office. States his wife works there and would like copy sent.

## 2023-05-19 NOTE — TELEPHONE ENCOUNTER
Left a message on pt's machine informing him the lab order is in epic and doppler results were faxed as requested.

## 2023-05-25 ENCOUNTER — TELEPHONE (OUTPATIENT)
Dept: FAMILY MEDICINE CLINIC | Age: 53
End: 2023-05-25

## 2023-05-25 NOTE — TELEPHONE ENCOUNTER
Patient called asking about Nerve conduction test results done last week. Called [facility] office to get results- had to leave VM.

## 2023-05-25 NOTE — TELEPHONE ENCOUNTER
Patient notified and will call with referral info- he did not want to go to [facility].

## 2023-05-25 NOTE — TELEPHONE ENCOUNTER
----- Message from ROSAURA Hopper CNP sent at 5/25/2023  2:53 PM EDT -----  Please notify patient that his EMG shows both carpal tunnel issues bilaterally and cervical nerve conduction problems. Referral to orthopedics is appropriate if he wants to.

## 2023-05-27 ENCOUNTER — HOSPITAL ENCOUNTER (OUTPATIENT)
Age: 53
Discharge: HOME | End: 2023-05-27

## 2023-05-27 DIAGNOSIS — R20.2 BILATERAL ARM NUMBNESS AND TINGLING WHILE SLEEPING: ICD-10-CM

## 2023-05-27 DIAGNOSIS — R20.0 BILATERAL ARM NUMBNESS AND TINGLING WHILE SLEEPING: ICD-10-CM

## 2023-05-27 LAB
DEPRECATED MEAN GLUCOSE BLD GHB EST-ACNC: 120 MG/DL (ref 70–126)
HBA1C MFR BLD HPLC: 6 % (ref 4.4–6.4)

## 2023-05-27 PROCEDURE — 36415 COLL VENOUS BLD VENIPUNCTURE: CPT

## 2023-05-27 PROCEDURE — 83036 HEMOGLOBIN GLYCOSYLATED A1C: CPT

## 2023-11-02 ENCOUNTER — OFFICE VISIT (OUTPATIENT)
Dept: FAMILY MEDICINE CLINIC | Age: 53
End: 2023-11-02
Payer: COMMERCIAL

## 2023-11-02 VITALS
OXYGEN SATURATION: 98 % | WEIGHT: 212.2 LBS | SYSTOLIC BLOOD PRESSURE: 138 MMHG | BODY MASS INDEX: 32.74 KG/M2 | TEMPERATURE: 98.2 F | DIASTOLIC BLOOD PRESSURE: 84 MMHG | HEART RATE: 64 BPM

## 2023-11-02 DIAGNOSIS — Z00.00 ENCOUNTER FOR WELL ADULT EXAM WITHOUT ABNORMAL FINDINGS: Primary | ICD-10-CM

## 2023-11-02 DIAGNOSIS — G56.03 CARPAL TUNNEL SYNDROME ON BOTH SIDES: ICD-10-CM

## 2023-11-02 DIAGNOSIS — F41.9 ANXIETY: ICD-10-CM

## 2023-11-02 DIAGNOSIS — Z23 FLU VACCINE NEED: ICD-10-CM

## 2023-11-02 DIAGNOSIS — Z12.5 PROSTATE CANCER SCREENING: ICD-10-CM

## 2023-11-02 PROCEDURE — 99396 PREV VISIT EST AGE 40-64: CPT | Performed by: NURSE PRACTITIONER

## 2023-11-02 PROCEDURE — 90674 CCIIV4 VAC NO PRSV 0.5 ML IM: CPT | Performed by: NURSE PRACTITIONER

## 2023-11-02 PROCEDURE — 90471 IMMUNIZATION ADMIN: CPT | Performed by: NURSE PRACTITIONER

## 2023-11-02 RX ORDER — LORAZEPAM 2 MG/1
2 TABLET ORAL 2 TIMES DAILY PRN
Qty: 60 TABLET | Refills: 2 | Status: SHIPPED | OUTPATIENT
Start: 2023-11-02 | End: 2024-01-31

## 2023-11-02 ASSESSMENT — ENCOUNTER SYMPTOMS
WHEEZING: 0
SORE THROAT: 0
ABDOMINAL PAIN: 0
EYE REDNESS: 0
EYE PAIN: 0
BACK PAIN: 0
CONSTIPATION: 0
COUGH: 0
VOMITING: 0
EYE DISCHARGE: 0
NAUSEA: 0
SHORTNESS OF BREATH: 0
RHINORRHEA: 0
TROUBLE SWALLOWING: 0
ALLERGIC/IMMUNOLOGIC NEGATIVE: 1
DIARRHEA: 0

## 2023-11-02 NOTE — PROGRESS NOTES
Well Adult Note  Name: Jaylon Talavera Date: 2023   MRN: [id number] Sex: Male   Age: 48 y.o. Ethnicity: Non- / Non    : 1970 Race: White (non-)      Elyse Vazquez is here for well adult exam.  History:  Due for flu vaccine. Patient here for 3-month regular periodic follow-up and refills on anxiety management and treatment with benzodiazepines. Patient states current dosing is adequate for the control desired without indication of side effect such as sedation, lethargy, addiction or more anxiety. Review of Systems   Constitutional:  Negative for activity change, fatigue and fever. HENT:  Negative for congestion, ear pain, rhinorrhea, sore throat and trouble swallowing. Eyes:  Negative for pain, discharge and redness. Respiratory:  Negative for cough, shortness of breath and wheezing. Cardiovascular: Negative. Gastrointestinal:  Negative for abdominal pain, constipation, diarrhea, nausea and vomiting. Endocrine: Negative. Genitourinary:  Negative for dysuria, frequency and urgency. Musculoskeletal:  Negative for arthralgias, back pain and myalgias. Skin:  Negative for rash. Allergic/Immunologic: Negative. Neurological:  Negative for dizziness, tremors, weakness and headaches. Hematological: Negative. Psychiatric/Behavioral:  Negative for dysphoric mood and sleep disturbance. The patient is not nervous/anxious. Allergies   Allergen Reactions    Morphine Rash     Other reaction(s): Unknown         Prior to Visit Medications    Medication Sig Taking? Authorizing Provider   LORazepam (ATIVAN) 2 MG tablet Take 1 tablet by mouth 2 times daily as needed for Anxiety for up to 90 days.  Max Daily Amount: 4 mg Yes Stephanie Hart APRN - CNP   meloxicam (MOBIC) 15 MG tablet Take 1 tablet by mouth daily as needed for Pain  Stephanie Hart APRN - CNP   Baclofen 10 MG PACK Take by mouth  Provider, MD Vianca   ascorbic acid

## 2023-11-03 RX ORDER — BACLOFEN 10 MG/1
TABLET ORAL
Qty: 30 TABLET | Refills: 1 | Status: SHIPPED | OUTPATIENT
Start: 2023-11-03

## 2023-12-12 ENCOUNTER — TELEPHONE (OUTPATIENT)
Dept: FAMILY MEDICINE CLINIC | Age: 53
End: 2023-12-12

## 2023-12-12 DIAGNOSIS — J06.9 URI WITH COUGH AND CONGESTION: Primary | ICD-10-CM

## 2023-12-12 RX ORDER — AZITHROMYCIN 250 MG/1
250 TABLET, FILM COATED ORAL SEE ADMIN INSTRUCTIONS
Qty: 6 TABLET | Refills: 0 | Status: SHIPPED | OUTPATIENT
Start: 2023-12-12 | End: 2023-12-17

## 2023-12-12 NOTE — TELEPHONE ENCOUNTER
Has had a cough x 2 weeks, requesting a zpak. I told him he may need an appt, requested I ask you first if you can send something in since he was just here on 11-2-23.        Chandni  [facility] [place]

## 2024-04-23 ENCOUNTER — NURSE ONLY (OUTPATIENT)
Dept: LAB | Age: 54
End: 2024-04-23

## 2024-04-23 DIAGNOSIS — Z12.5 PROSTATE CANCER SCREENING: ICD-10-CM

## 2024-04-23 LAB — PSA SERPL-MCNC: 0.34 NG/ML (ref 0–1)

## 2024-05-14 ENCOUNTER — OFFICE VISIT (OUTPATIENT)
Dept: FAMILY MEDICINE CLINIC | Age: 54
End: 2024-05-14
Payer: COMMERCIAL

## 2024-05-14 VITALS
SYSTOLIC BLOOD PRESSURE: 138 MMHG | BODY MASS INDEX: 33.02 KG/M2 | DIASTOLIC BLOOD PRESSURE: 82 MMHG | WEIGHT: 214 LBS | TEMPERATURE: 98.4 F

## 2024-05-14 DIAGNOSIS — R53.83 FATIGUE, UNSPECIFIED TYPE: ICD-10-CM

## 2024-05-14 DIAGNOSIS — R68.82 LOW LIBIDO: ICD-10-CM

## 2024-05-14 DIAGNOSIS — R20.2 PARESTHESIA OF UPPER EXTREMITY: Primary | ICD-10-CM

## 2024-05-14 DIAGNOSIS — F41.9 ANXIETY: ICD-10-CM

## 2024-05-14 PROCEDURE — G8417 CALC BMI ABV UP PARAM F/U: HCPCS | Performed by: NURSE PRACTITIONER

## 2024-05-14 PROCEDURE — 1036F TOBACCO NON-USER: CPT | Performed by: NURSE PRACTITIONER

## 2024-05-14 PROCEDURE — 3017F COLORECTAL CA SCREEN DOC REV: CPT | Performed by: NURSE PRACTITIONER

## 2024-05-14 PROCEDURE — 99214 OFFICE O/P EST MOD 30 MIN: CPT | Performed by: NURSE PRACTITIONER

## 2024-05-14 PROCEDURE — G8427 DOCREV CUR MEDS BY ELIG CLIN: HCPCS | Performed by: NURSE PRACTITIONER

## 2024-05-14 RX ORDER — LORAZEPAM 2 MG/1
2 TABLET ORAL 2 TIMES DAILY PRN
Qty: 60 TABLET | Refills: 2 | Status: SHIPPED | OUTPATIENT
Start: 2024-05-14 | End: 2024-08-12

## 2024-05-14 RX ORDER — BACLOFEN 10 MG/1
10 TABLET ORAL 3 TIMES DAILY
Qty: 90 TABLET | Refills: 0 | Status: SHIPPED | OUTPATIENT
Start: 2024-05-14 | End: 2024-06-13

## 2024-05-14 RX ORDER — BACLOFEN 10 MG/1
TABLET ORAL
Qty: 30 TABLET | Refills: 0 | Status: CANCELLED | OUTPATIENT
Start: 2024-05-14

## 2024-05-14 SDOH — ECONOMIC STABILITY: INCOME INSECURITY: HOW HARD IS IT FOR YOU TO PAY FOR THE VERY BASICS LIKE FOOD, HOUSING, MEDICAL CARE, AND HEATING?: NOT HARD AT ALL

## 2024-05-14 SDOH — ECONOMIC STABILITY: FOOD INSECURITY: WITHIN THE PAST 12 MONTHS, YOU WORRIED THAT YOUR FOOD WOULD RUN OUT BEFORE YOU GOT MONEY TO BUY MORE.: NEVER TRUE

## 2024-05-14 SDOH — ECONOMIC STABILITY: FOOD INSECURITY: WITHIN THE PAST 12 MONTHS, THE FOOD YOU BOUGHT JUST DIDN'T LAST AND YOU DIDN'T HAVE MONEY TO GET MORE.: NEVER TRUE

## 2024-05-14 ASSESSMENT — ENCOUNTER SYMPTOMS
EYE REDNESS: 0
COUGH: 0
DIARRHEA: 0
NAUSEA: 0
TROUBLE SWALLOWING: 0
WHEEZING: 0
ALLERGIC/IMMUNOLOGIC NEGATIVE: 1
RHINORRHEA: 0
EYE PAIN: 0
BACK PAIN: 0
SORE THROAT: 0
VOMITING: 0
EYE DISCHARGE: 0
CONSTIPATION: 0
ABDOMINAL PAIN: 0
SHORTNESS OF BREATH: 0

## 2024-05-14 ASSESSMENT — PATIENT HEALTH QUESTIONNAIRE - PHQ9
SUM OF ALL RESPONSES TO PHQ QUESTIONS 1-9: 2
SUM OF ALL RESPONSES TO PHQ QUESTIONS 1-9: 2
2. FEELING DOWN, DEPRESSED OR HOPELESS: NOT AT ALL
SUM OF ALL RESPONSES TO PHQ9 QUESTIONS 1 & 2: 2
SUM OF ALL RESPONSES TO PHQ QUESTIONS 1-9: 2
1. LITTLE INTEREST OR PLEASURE IN DOING THINGS: MORE THAN HALF THE DAYS
SUM OF ALL RESPONSES TO PHQ QUESTIONS 1-9: 2

## 2024-05-14 NOTE — PROGRESS NOTES
[facility]ESSION[facility]  [facility]  [address] [id number]  Dept: [phone]  Dept Fax: [phone]  Loc: [phone]     Visit Date:  5/14/2024      Patient:  Javi Smith  YOB: 1970    HPI:     Chief Complaint   Patient presents with    Stress     Patient reports extra stress at home; says he will just work and go home and lay in bed     Results     States he had colonoscopy and test/PSA level checked     Follow-up     Neuro referred him to [facility]; he does not want to go there        Chief Complaint  Patient presents with  · Stress    Patient reports extra stress at home; says he will just work and go home and lay in bed   · Results    States he had colonoscopy and test/PSA level checked   · Follow-up    Neuro referred him to [facility]; he does not want to go there     Patient also states he has had severe erectile dysfunction and low libido for about 6 months now which is affecting his relationship.  He has never had this problem before.    Requesting refill of Ativan          Medications    Current Outpatient Medications:     LORazepam (ATIVAN) 2 MG tablet, Take 1 tablet by mouth 2 times daily as needed for Anxiety for up to 90 days. Max Daily Amount: 4 mg, Disp: 60 tablet, Rfl: 2    baclofen (LIORESAL) 10 MG tablet, Take 1 tablet by mouth 3 times daily, Disp: 90 tablet, Rfl: 0    ascorbic acid (VITAMIN C) 500 MG tablet, Take by mouth, Disp: , Rfl:     ELDERBERRY PO, Take by mouth, Disp: , Rfl:     vitamin D 25 MCG (1000 UT) CAPS, Take by mouth, Disp: , Rfl:     aspirin EC 81 MG EC tablet, Take 1 tablet by mouth daily., Disp: 30 tablet, Rfl: 3    meloxicam (MOBIC) 15 MG tablet, Take 1 tablet by mouth daily as needed for Pain, Disp: 90 tablet, Rfl: 1    The patient is allergic to morphine.    Past Medical History  Javi  has a past medical history of MI, old.    Subjective:      Review of Systems   Constitutional:  Negative for activity

## 2024-05-15 DIAGNOSIS — F41.9 ANXIETY: ICD-10-CM

## 2024-05-15 RX ORDER — LORAZEPAM 1 MG/1
TABLET ORAL
Qty: 120 TABLET | OUTPATIENT
Start: 2024-05-15

## 2024-05-17 ENCOUNTER — TELEPHONE (OUTPATIENT)
Dept: FAMILY MEDICINE CLINIC | Age: 54
End: 2024-05-17

## 2024-05-17 DIAGNOSIS — R20.0 BILATERAL HAND NUMBNESS: ICD-10-CM

## 2024-05-17 DIAGNOSIS — G56.03 CARPAL TUNNEL SYNDROME ON BOTH SIDES: Primary | ICD-10-CM

## 2024-05-17 DIAGNOSIS — M54.12 CERVICAL RADICULOPATHY, CHRONIC: ICD-10-CM

## 2024-05-17 NOTE — TELEPHONE ENCOUNTER
Priyanka pt's wife states pt requested referral to [facility] but is unable to go there due to wife's insurance being through [facility]. She is requesting referral to Dr. Vaz for CTS and Dr. Olivas for Cervical Radiculopathy found on EMG's, Hx of Anterior Cervical Discectomy at [facility] in approx 2009  per surgical hx  She is requesting referrals to call her with appt. OK per Hernan Rock CNP. Referrals were placed.

## 2024-05-21 LAB — TSH SERPL DL<=0.05 MIU/L-ACNC: 0.57 UIU/ML (ref 0.4–4.1)

## 2024-05-22 LAB
SEX HORMONE BINDING GLOBULIN: 33.2 NMOL/L (ref 19.3–76.4)
TESTOSTERONE FREE, CALC: 64.7 PG/ML (ref 47–244)
TESTOSTERONE FREE: 327 NG/DL (ref 300–890)

## 2024-08-23 ENCOUNTER — TELEPHONE (OUTPATIENT)
Dept: FAMILY MEDICINE CLINIC | Age: 54
End: 2024-08-23

## 2024-08-23 DIAGNOSIS — R20.0 BILATERAL HAND NUMBNESS: ICD-10-CM

## 2024-08-23 DIAGNOSIS — G56.03 CARPAL TUNNEL SYNDROME ON BOTH SIDES: Primary | ICD-10-CM

## 2024-08-23 DIAGNOSIS — R20.2 PARESTHESIA OF UPPER EXTREMITY: ICD-10-CM

## 2024-08-23 NOTE — TELEPHONE ENCOUNTER
Patient reports cutting hand in half on 7/17/24, requiring immediate surgery. Cut through the bone with a saw at work, this is a worker's comp case. He states they are not giving him anything for pain, and he is doing aggressive therapy for this and is off until October. Requesting ralf refer him to neuroscience pain management, as he knows Ralf probably would not prescribe anything due to this being a workers comp case. Please advise, call patient back

## 2024-08-27 ENCOUNTER — OFFICE VISIT (OUTPATIENT)
Dept: FAMILY MEDICINE CLINIC | Age: 54
End: 2024-08-27
Payer: COMMERCIAL

## 2024-08-27 ENCOUNTER — TELEPHONE (OUTPATIENT)
Dept: FAMILY MEDICINE CLINIC | Age: 54
End: 2024-08-27

## 2024-08-27 VITALS
HEART RATE: 78 BPM | BODY MASS INDEX: 31.63 KG/M2 | SYSTOLIC BLOOD PRESSURE: 136 MMHG | DIASTOLIC BLOOD PRESSURE: 82 MMHG | WEIGHT: 205 LBS

## 2024-08-27 DIAGNOSIS — Z87.891 PERSONAL HISTORY OF TOBACCO USE: ICD-10-CM

## 2024-08-27 DIAGNOSIS — G89.18 POST-OP PAIN: ICD-10-CM

## 2024-08-27 DIAGNOSIS — Z00.00 ENCOUNTER FOR WELL ADULT EXAM WITHOUT ABNORMAL FINDINGS: ICD-10-CM

## 2024-08-27 DIAGNOSIS — Z12.5 SCREENING FOR PROSTATE CANCER: ICD-10-CM

## 2024-08-27 DIAGNOSIS — Z00.00 WELL ADULT EXAM: Primary | ICD-10-CM

## 2024-08-27 PROCEDURE — G0296 VISIT TO DETERM LDCT ELIG: HCPCS | Performed by: NURSE PRACTITIONER

## 2024-08-27 PROCEDURE — 99396 PREV VISIT EST AGE 40-64: CPT | Performed by: NURSE PRACTITIONER

## 2024-08-27 RX ORDER — HYDROCODONE BITARTRATE AND ACETAMINOPHEN 5; 325 MG/1; MG/1
1 TABLET ORAL EVERY 8 HOURS PRN
Qty: 21 TABLET | Refills: 0 | Status: SHIPPED | OUTPATIENT
Start: 2024-08-27 | End: 2024-09-03

## 2024-08-27 ASSESSMENT — ENCOUNTER SYMPTOMS
ALLERGIC/IMMUNOLOGIC NEGATIVE: 1
DIARRHEA: 0
VOMITING: 0
ABDOMINAL PAIN: 0
BACK PAIN: 0
SHORTNESS OF BREATH: 0
CONSTIPATION: 0
SORE THROAT: 0
EYE DISCHARGE: 0
NAUSEA: 0
WHEEZING: 0
RHINORRHEA: 0
TROUBLE SWALLOWING: 0
COUGH: 0
EYE REDNESS: 0
EYE PAIN: 0

## 2024-08-27 NOTE — TELEPHONE ENCOUNTER
This is a workmans comp claim so we would need to see him for other issues. Is that ok? Is he due for other check up?

## 2024-08-27 NOTE — PROGRESS NOTES
Well Adult Note  Name: Javi Smith Today’s Date: 2024   MRN: [id number] Sex: Male   Age: 53 y.o. Ethnicity: Non- / Non    : 1970 Race: White (non-)      Javi Smith is here for a well adult exam.       Subjective   History:  Patient recently had a severe band saw a laceration through the web of his left hand that was repaired by surgeon.  He is currently in physical therapy and was told that he may be able\to recover up to 70% of previous function.  Patient is having some ongoing pain problems, especially with physical therapy but orthopedics will not give him any pain medications at this point.    Review of Systems   Constitutional:  Negative for activity change, fatigue and fever.   HENT:  Negative for congestion, ear pain, rhinorrhea, sore throat and trouble swallowing.    Eyes:  Negative for pain, discharge and redness.   Respiratory:  Negative for cough, shortness of breath and wheezing.    Cardiovascular: Negative.    Gastrointestinal:  Negative for abdominal pain, constipation, diarrhea, nausea and vomiting.   Endocrine: Negative.    Genitourinary:  Negative for dysuria, frequency and urgency.   Musculoskeletal:  Negative for arthralgias, back pain and myalgias.        Left hand pain   Skin:  Negative for rash.   Allergic/Immunologic: Negative.    Neurological:  Negative for dizziness, tremors, weakness and headaches.   Hematological: Negative.    Psychiatric/Behavioral:  Negative for dysphoric mood and sleep disturbance. The patient is not nervous/anxious.        Allergies   Allergen Reactions    Morphine Rash     Other reaction(s): Unknown     Prior to Visit Medications    Medication Sig Taking? Authorizing Provider   HYDROcodone-acetaminophen (NORCO) 5-325 MG per tablet Take 1 tablet by mouth every 8 hours as needed for Pain for up to 7 days. Intended supply: 7 days. Take lowest dose possible to manage pain Max Daily Amount: 3 tablets Yes Karan Rock

## 2024-08-27 NOTE — TELEPHONE ENCOUNTER
You referred him to Dr Atkinson for pain management. They will not see him until he is 90 days post op. This is workers comp. He is requesting something for pain, but he has a bad heart so is very limited on what he can take. He does therapy twice a week and is to [facility] in October. Asking if there is anything you can do for him?    Jerrod Dsouza will take care of this when done

## 2024-09-30 ENCOUNTER — HOSPITAL ENCOUNTER (OUTPATIENT)
Dept: CT IMAGING | Age: 54
Discharge: HOME | End: 2024-09-30
Payer: COMMERCIAL

## 2024-09-30 ENCOUNTER — TELEPHONE (OUTPATIENT)
Dept: FAMILY MEDICINE CLINIC | Age: 54
End: 2024-09-30

## 2024-09-30 DIAGNOSIS — Z87.891 PERSONAL HISTORY OF TOBACCO USE: ICD-10-CM

## 2024-09-30 PROCEDURE — 71271 CT THORAX LUNG CANCER SCR C-: CPT

## 2024-09-30 NOTE — TELEPHONE ENCOUNTER
She is going to talk to his wife who works with cardiology at [facility] and get back with us.

## 2024-09-30 NOTE — TELEPHONE ENCOUNTER
----- Message from ROSAURA Rivera CNP sent at 9/30/2024  8:34 AM EDT -----  Cholesterol is elevated to the degree he might benefit from a statin.  I am happy to order one if he would like or we can talk about it on his next visit.

## 2024-10-01 LAB — PSA, ULTRASENSITIVE: 0.23 NG/ML (ref 0–4)

## 2024-10-02 ENCOUNTER — TELEPHONE (OUTPATIENT)
Dept: FAMILY MEDICINE CLINIC | Age: 54
End: 2024-10-02

## 2024-10-02 DIAGNOSIS — E78.5 HYPERLIPIDEMIA, UNSPECIFIED HYPERLIPIDEMIA TYPE: Primary | ICD-10-CM

## 2024-10-02 RX ORDER — PRAVASTATIN SODIUM 20 MG
20 TABLET ORAL DAILY
Qty: 90 TABLET | Refills: 1 | Status: SHIPPED | OUTPATIENT
Start: 2024-10-02

## 2024-10-02 NOTE — TELEPHONE ENCOUNTER
He would like to start Crestor, can you send to [facility] pharmacy please.

## 2024-12-03 ENCOUNTER — OFFICE VISIT (OUTPATIENT)
Dept: FAMILY MEDICINE CLINIC | Age: 54
End: 2024-12-03

## 2024-12-03 VITALS
TEMPERATURE: 98 F | SYSTOLIC BLOOD PRESSURE: 138 MMHG | HEART RATE: 94 BPM | OXYGEN SATURATION: 95 % | RESPIRATION RATE: 18 BRPM | BODY MASS INDEX: 32.58 KG/M2 | WEIGHT: 215 LBS | HEIGHT: 68 IN | DIASTOLIC BLOOD PRESSURE: 68 MMHG

## 2024-12-03 DIAGNOSIS — I77.6 VASCULITIS (HCC): ICD-10-CM

## 2024-12-03 DIAGNOSIS — G89.29 CHRONIC HAND PAIN, LEFT: ICD-10-CM

## 2024-12-03 DIAGNOSIS — Z71.3 DIETARY COUNSELING: Primary | ICD-10-CM

## 2024-12-03 DIAGNOSIS — M79.642 CHRONIC HAND PAIN, LEFT: ICD-10-CM

## 2024-12-03 RX ORDER — PREDNISONE 20 MG/1
20 TABLET ORAL 2 TIMES DAILY
Qty: 10 TABLET | Refills: 0 | Status: SHIPPED | OUTPATIENT
Start: 2024-12-03 | End: 2024-12-08

## 2024-12-03 NOTE — PROGRESS NOTES
Pt called in stating that Spiriva is no longer covered by her insurance and that she would like something else.  Please assist. history of MI, old.    Subjective:      Review of Systems    Objective:     /68 (Site: Right Upper Arm)   Pulse 94   Temp 98 °F (36.7 °C) (Oral)   Resp 18   Ht 1.715 m (5' 7.5\")   Wt 97.5 kg (215 lb)   SpO2 95%   BMI 33.18 kg/m²     Physical Exam    Assessment/Plan:      Javi was seen today for check-up.    Diagnoses and all orders for this visit:    Dietary counseling    Vasculitis (HCC)  -     predniSONE (DELTASONE) 20 MG tablet; Take 1 tablet by mouth 2 times daily for 5 days    Chronic hand pain, left  -     diclofenac sodium (VOLTAREN) 1 % GEL; Apply 4 g topically 4 times daily        Return if symptoms worsen or fail to improve.    Patient instructions given elisabeth.        Electronically signed by ROSAURA Rivera CNP on 12/6/2024 at 8:04 AM

## 2025-01-16 ENCOUNTER — OFFICE VISIT (OUTPATIENT)
Dept: FAMILY MEDICINE CLINIC | Age: 55
End: 2025-01-16
Payer: COMMERCIAL

## 2025-01-16 VITALS
OXYGEN SATURATION: 97 % | HEART RATE: 80 BPM | DIASTOLIC BLOOD PRESSURE: 86 MMHG | WEIGHT: 221 LBS | SYSTOLIC BLOOD PRESSURE: 150 MMHG | TEMPERATURE: 98 F | RESPIRATION RATE: 16 BRPM | BODY MASS INDEX: 34.1 KG/M2

## 2025-01-16 DIAGNOSIS — F41.9 ANXIETY: ICD-10-CM

## 2025-01-16 DIAGNOSIS — Z87.891 STOPPED SMOKING WITH GREATER THAN 30 PACK YEAR HISTORY: ICD-10-CM

## 2025-01-16 DIAGNOSIS — J20.9 ACUTE BRONCHITIS, UNSPECIFIED ORGANISM: Primary | ICD-10-CM

## 2025-01-16 PROCEDURE — 3017F COLORECTAL CA SCREEN DOC REV: CPT | Performed by: NURSE PRACTITIONER

## 2025-01-16 PROCEDURE — 99214 OFFICE O/P EST MOD 30 MIN: CPT | Performed by: NURSE PRACTITIONER

## 2025-01-16 PROCEDURE — 1036F TOBACCO NON-USER: CPT | Performed by: NURSE PRACTITIONER

## 2025-01-16 PROCEDURE — G8427 DOCREV CUR MEDS BY ELIG CLIN: HCPCS | Performed by: NURSE PRACTITIONER

## 2025-01-16 PROCEDURE — G8417 CALC BMI ABV UP PARAM F/U: HCPCS | Performed by: NURSE PRACTITIONER

## 2025-01-16 RX ORDER — LORAZEPAM 2 MG/1
2 TABLET ORAL 2 TIMES DAILY PRN
Qty: 60 TABLET | Refills: 2 | Status: SHIPPED | OUTPATIENT
Start: 2025-01-16 | End: 2025-04-16

## 2025-01-16 RX ORDER — AMOXICILLIN AND CLAVULANATE POTASSIUM 500; 125 MG/1; MG/1
1 TABLET, FILM COATED ORAL 2 TIMES DAILY
Qty: 20 TABLET | Refills: 0 | Status: SHIPPED | OUTPATIENT
Start: 2025-01-16 | End: 2025-01-26

## 2025-01-16 RX ORDER — CODEINE PHOSPHATE AND GUAIFENESIN 10; 100 MG/5ML; MG/5ML
5 SOLUTION ORAL 3 TIMES DAILY PRN
Qty: 75 ML | Refills: 0 | Status: SHIPPED | OUTPATIENT
Start: 2025-01-16 | End: 2025-01-21

## 2025-01-16 RX ORDER — LORAZEPAM 2 MG/1
2 TABLET ORAL
COMMUNITY
Start: 2024-10-15 | End: 2025-01-16

## 2025-01-16 SDOH — ECONOMIC STABILITY: FOOD INSECURITY: WITHIN THE PAST 12 MONTHS, YOU WORRIED THAT YOUR FOOD WOULD RUN OUT BEFORE YOU GOT MONEY TO BUY MORE.: NEVER TRUE

## 2025-01-16 SDOH — ECONOMIC STABILITY: FOOD INSECURITY: WITHIN THE PAST 12 MONTHS, THE FOOD YOU BOUGHT JUST DIDN'T LAST AND YOU DIDN'T HAVE MONEY TO GET MORE.: NEVER TRUE

## 2025-01-16 ASSESSMENT — PATIENT HEALTH QUESTIONNAIRE - PHQ9
2. FEELING DOWN, DEPRESSED OR HOPELESS: NOT AT ALL
SUM OF ALL RESPONSES TO PHQ9 QUESTIONS 1 & 2: 0
SUM OF ALL RESPONSES TO PHQ QUESTIONS 1-9: 0
1. LITTLE INTEREST OR PLEASURE IN DOING THINGS: NOT AT ALL
SUM OF ALL RESPONSES TO PHQ QUESTIONS 1-9: 0

## 2025-01-16 ASSESSMENT — ENCOUNTER SYMPTOMS
SORE THROAT: 1
NAUSEA: 0
BACK PAIN: 0
COUGH: 1
CONSTIPATION: 0
EYE DISCHARGE: 0
RHINORRHEA: 1
TROUBLE SWALLOWING: 0
EYE REDNESS: 0
DIARRHEA: 0
ABDOMINAL PAIN: 0
VOMITING: 0
SHORTNESS OF BREATH: 0
ALLERGIC/IMMUNOLOGIC NEGATIVE: 1
WHEEZING: 0
EYE PAIN: 0

## 2025-01-16 NOTE — PROGRESS NOTES
Judgment normal.         Assessment/Plan:      Javi was seen today for cough.    Diagnoses and all orders for this visit:    Acute bronchitis, unspecified organism  -     amoxicillin-clavulanate (AUGMENTIN) 500-125 MG per tablet; Take 1 tablet by mouth 2 times daily for 10 days  -     guaiFENesin-codeine (VIRTUSSIN A/C) 100-10 MG/5ML liquid; Take 5 mLs by mouth 3 times daily as needed for Cough for up to 5 days. Max Daily Amount: 15 mLs    Anxiety  -     LORazepam (ATIVAN) 2 MG tablet; Take 1 tablet by mouth 2 times daily as needed for Anxiety for up to 90 days. Max Daily Amount: 4 mg    Stopped smoking with greater than 30 pack year history    Patient has a history of pneumonia and this is a concern if his condition persists.    Return if symptoms worsen or fail to improve.    Patient instructions given maria guadaluped.        Electronically signed by ROSAURA Rivera CNP on 1/16/2025 at 4:45 PM                
165.1

## 2025-01-27 ENCOUNTER — TELEPHONE (OUTPATIENT)
Dept: FAMILY MEDICINE CLINIC | Age: 55
End: 2025-01-27

## 2025-01-27 DIAGNOSIS — J20.9 ACUTE BRONCHITIS, UNSPECIFIED ORGANISM: Primary | ICD-10-CM

## 2025-01-27 RX ORDER — PREDNISONE 20 MG/1
20 TABLET ORAL 2 TIMES DAILY
Qty: 10 TABLET | Refills: 0 | Status: SHIPPED | OUTPATIENT
Start: 2025-01-27 | End: 2025-02-01

## 2025-01-27 NOTE — TELEPHONE ENCOUNTER
Patient was seen 01/16/2024 for respiratory infection. He took his antibiotics and felt better while on them but he is now continuing to have coughing and thick white phlegm when he is able to get it out but normally its hard for him to cough up anything. His voice is also hoarse. Asking if there is any other treatment that could help    [facility] pharmacy    Ok to leave VM

## 2025-06-30 ENCOUNTER — OFFICE VISIT (OUTPATIENT)
Dept: FAMILY MEDICINE CLINIC | Age: 55
End: 2025-06-30
Payer: COMMERCIAL

## 2025-06-30 VITALS
HEART RATE: 68 BPM | WEIGHT: 216.4 LBS | TEMPERATURE: 98.1 F | HEIGHT: 68 IN | BODY MASS INDEX: 32.8 KG/M2 | RESPIRATION RATE: 18 BRPM | DIASTOLIC BLOOD PRESSURE: 74 MMHG | SYSTOLIC BLOOD PRESSURE: 134 MMHG

## 2025-06-30 DIAGNOSIS — L08.9 SKIN INFECTION: ICD-10-CM

## 2025-06-30 DIAGNOSIS — H70.93 MASTOIDITIS OF BOTH SIDES: ICD-10-CM

## 2025-06-30 DIAGNOSIS — R59.0 LYMPHADENOPATHY, POSTAURICULAR: Primary | ICD-10-CM

## 2025-06-30 PROCEDURE — 3017F COLORECTAL CA SCREEN DOC REV: CPT | Performed by: NURSE PRACTITIONER

## 2025-06-30 PROCEDURE — G8417 CALC BMI ABV UP PARAM F/U: HCPCS | Performed by: NURSE PRACTITIONER

## 2025-06-30 PROCEDURE — G8427 DOCREV CUR MEDS BY ELIG CLIN: HCPCS | Performed by: NURSE PRACTITIONER

## 2025-06-30 PROCEDURE — 99214 OFFICE O/P EST MOD 30 MIN: CPT | Performed by: NURSE PRACTITIONER

## 2025-06-30 PROCEDURE — 96372 THER/PROPH/DIAG INJ SC/IM: CPT | Performed by: NURSE PRACTITIONER

## 2025-06-30 PROCEDURE — 1036F TOBACCO NON-USER: CPT | Performed by: NURSE PRACTITIONER

## 2025-06-30 RX ORDER — LORAZEPAM 2 MG/1
2 TABLET ORAL EVERY 8 HOURS PRN
COMMUNITY
Start: 2025-06-28

## 2025-06-30 RX ORDER — CEFDINIR 300 MG/1
300 CAPSULE ORAL 2 TIMES DAILY
Qty: 20 CAPSULE | Refills: 0 | Status: SHIPPED | OUTPATIENT
Start: 2025-06-30 | End: 2025-07-10

## 2025-06-30 RX ORDER — CEFDINIR 300 MG/1
300 CAPSULE ORAL 2 TIMES DAILY
Qty: 20 CAPSULE | Refills: 0 | Status: SHIPPED | OUTPATIENT
Start: 2025-06-30 | End: 2025-06-30 | Stop reason: SDUPTHER

## 2025-06-30 RX ORDER — CEFTRIAXONE 500 MG/1
500 INJECTION, POWDER, FOR SOLUTION INTRAMUSCULAR; INTRAVENOUS ONCE
Status: COMPLETED | OUTPATIENT
Start: 2025-06-30 | End: 2025-06-30

## 2025-06-30 RX ADMIN — CEFTRIAXONE 500 MG: 500 INJECTION, POWDER, FOR SOLUTION INTRAMUSCULAR; INTRAVENOUS at 09:22

## 2025-06-30 ASSESSMENT — ENCOUNTER SYMPTOMS
BACK PAIN: 0
WHEEZING: 0
VOMITING: 0
CONSTIPATION: 0
RHINORRHEA: 0
EYE REDNESS: 0
EYE PAIN: 0
DIARRHEA: 0
NAUSEA: 0
TROUBLE SWALLOWING: 0
ABDOMINAL PAIN: 0
EYE DISCHARGE: 0
ALLERGIC/IMMUNOLOGIC NEGATIVE: 1
SORE THROAT: 0
SHORTNESS OF BREATH: 0
COUGH: 0

## 2025-06-30 NOTE — PROGRESS NOTES
Administrations This Visit       cefTRIAXone (ROCEPHIN) injection 500 mg       Admin Date  06/30/2025  09:22 Action  Given Dose  500 mg Route  IntraMUSCular Site  Dorsogluteal Right Documented By  Lilibeth Bey RCP    NDC: 4792-5691-86    Lot#: [id number]    : HOSPIRA    Patient Supplied?: No    Comments: Mixed with 1ml of Lidocaine  NDC 47819-648-12  Lot [id number]  Exp Date 1/31/2026                 After obtaining consent, and per orders of Ralf Rock CNP, injection of Rocephin 500 mg given in Right upper quad. gluteus by Lilibeth Bey CMA([facility]). Patient instructed to report any adverse reaction to me immediately. Patient tolerated injection well.  
differently: Take 1 tablet by mouth as needed), Disp: 30 tablet, Rfl: 3    The patient is allergic to morphine.    Past Medical History  Javi  has a past medical history of MI, old.    Subjective:      Review of Systems   Constitutional:  Negative for activity change, fatigue and fever.   HENT:  Negative for congestion, ear pain, rhinorrhea, sore throat and trouble swallowing.    Eyes:  Negative for pain, discharge and redness.   Respiratory:  Negative for cough, shortness of breath and wheezing.    Cardiovascular: Negative.    Gastrointestinal:  Negative for abdominal pain, constipation, diarrhea, nausea and vomiting.   Endocrine: Negative.    Genitourinary:  Negative for dysuria, frequency and urgency.   Musculoskeletal:  Negative for arthralgias, back pain and myalgias.   Skin:  Positive for wound. Negative for rash.   Allergic/Immunologic: Negative.    Neurological:  Negative for dizziness, tremors, weakness and headaches.   Hematological:  Positive for adenopathy.   Psychiatric/Behavioral:  Negative for dysphoric mood and sleep disturbance. The patient is not nervous/anxious.        Objective:     /74 (BP Site: Right Upper Arm, Patient Position: Sitting, BP Cuff Size: Large Adult)   Pulse 68   Temp 98.1 °F (36.7 °C) (Oral)   Resp 18   Ht 1.715 m (5' 7.52\")   Wt 98.2 kg (216 lb 6.4 oz)   BMI 33.37 kg/m²     Physical Exam  Constitutional:       General: He is not in acute distress.     Appearance: He is well-developed. He is not diaphoretic.   HENT:      Right Ear: External ear normal.      Left Ear: External ear normal.      Nose: Nose normal.      Mouth/Throat:      Mouth: Mucous membranes are moist.   Eyes:      General:         Right eye: No discharge.         Left eye: No discharge.      Conjunctiva/sclera: Conjunctivae normal.      Pupils: Pupils are equal, round, and reactive to light.   Neck:      Vascular: No JVD.   Cardiovascular:      Rate and Rhythm: Normal rate and regular rhythm.

## 2025-07-01 ENCOUNTER — RESULTS FOLLOW-UP (OUTPATIENT)
Dept: FAMILY MEDICINE CLINIC | Age: 55
End: 2025-07-01

## 2025-07-01 LAB
BASOPHILS ABSOLUTE: 0.04 K/UL (ref 0–0.2)
BASOPHILS RELATIVE PERCENT: 0.6 % (ref 0–2)
EOSINOPHILS ABSOLUTE: 0.06 K/UL (ref 0–0.8)
EOSINOPHILS RELATIVE PERCENT: 0.9 % (ref 0–5)
HCT VFR BLD CALC: 44.7 % (ref 39–52)
HEMOGLOBIN: 14.4 G/DL (ref 13–18)
IMMATURE GRANS (ABS): 0.02 K/UL (ref 0–0.06)
IMMATURE GRANULOCYTES %: 0.3 % (ref 0–2)
LYMPHOCYTES ABSOLUTE: 1.82 K/UL (ref 0.9–5.2)
LYMPHOCYTES RELATIVE PERCENT: 26.7 % (ref 20–45)
MCH RBC QN AUTO: 28.3 PG (ref 26–32)
MCHC RBC AUTO-ENTMCNC: 32.2 G/DL (ref 32–35)
MCV RBC AUTO: 88 FL (ref 75–100)
MONOCYTES ABSOLUTE: 0.39 K/UL (ref 0.1–1)
MONOCYTES RELATIVE PERCENT: 5.7 % (ref 0–13)
NEUTROPHILS ABSOLUTE: 4.48 K/UL (ref 1.9–8)
NEUTROPHILS RELATIVE PERCENT: 65.8 % (ref 45–75)
PDW BLD-RTO: 12.9 % (ref 11.2–14.8)
PLATELET # BLD: 178 THOUS/CMM (ref 140–440)
RBC # BLD: 5.09 MILL/CMM (ref 4.4–6.1)
WBC # BLD: 6.8 THDS/CMM (ref 3.6–11)